# Patient Record
Sex: MALE | Race: WHITE | NOT HISPANIC OR LATINO | Employment: FULL TIME | URBAN - METROPOLITAN AREA
[De-identification: names, ages, dates, MRNs, and addresses within clinical notes are randomized per-mention and may not be internally consistent; named-entity substitution may affect disease eponyms.]

---

## 2017-03-09 ENCOUNTER — OFFICE VISIT (OUTPATIENT)
Dept: OTOLARYNGOLOGY | Facility: CLINIC | Age: 47
End: 2017-03-09
Payer: COMMERCIAL

## 2017-03-09 ENCOUNTER — OFFICE VISIT (OUTPATIENT)
Dept: AUDIOLOGY | Facility: CLINIC | Age: 47
End: 2017-03-09
Payer: COMMERCIAL

## 2017-03-09 VITALS — BODY MASS INDEX: 25.05 KG/M2 | HEIGHT: 70 IN | RESPIRATION RATE: 12 BRPM | WEIGHT: 175 LBS

## 2017-03-09 DIAGNOSIS — Z77.122 EXPOSED TO NOISE: ICD-10-CM

## 2017-03-09 DIAGNOSIS — R42 DIZZINESS AND GIDDINESS: ICD-10-CM

## 2017-03-09 DIAGNOSIS — H90.5 SENSORY HEARING LOSS, UNILATERAL: ICD-10-CM

## 2017-03-09 DIAGNOSIS — R42 DIZZINESS: Primary | ICD-10-CM

## 2017-03-09 PROCEDURE — 92557 COMPREHENSIVE HEARING TEST: CPT | Performed by: AUDIOLOGIST

## 2017-03-09 PROCEDURE — 99203 OFFICE O/P NEW LOW 30 MIN: CPT | Performed by: OTOLARYNGOLOGY

## 2017-03-09 PROCEDURE — 99207 ZZC NO CHARGE LOS: CPT | Performed by: AUDIOLOGIST

## 2017-03-09 PROCEDURE — 92567 TYMPANOMETRY: CPT | Performed by: AUDIOLOGIST

## 2017-03-09 NOTE — NURSING NOTE
"Chief Complaint   Patient presents with     Consult     dizziness and balance issues       Initial Resp 12  Ht 1.778 m (5' 10\")  Wt 79.4 kg (175 lb)  BMI 25.11 kg/m2 Estimated body mass index is 25.11 kg/(m^2) as calculated from the following:    Height as of this encounter: 1.778 m (5' 10\").    Weight as of this encounter: 79.4 kg (175 lb).  Medication Reconciliation: complete     Michael Clement CMA      "

## 2017-03-09 NOTE — PATIENT INSTRUCTIONS
Scheduling Information  To schedule your CT/MRI scan, please contact [facility] at [phone] OR [facility] at [phone]    To schedule your Surgery, please contact our Specialty Schedulers at [phone]      ENT Clinic Locations Clinic Hours Telephone Number     [facility]  [address] [place] [address]   Monday:           1:00pm -- 5:00pm    Friday:              8:00am - 12:00pm   To schedule/reschedule an appointment with   Dr. Jimenez,   please contact our   Specialty Scheduling Department at:     [phone]       Miriam Espitia  [address] [place] [address] Tuesday:          8:00am -- 2:00pm         Urgent Care Locations Clinic Hours Telephone Numbers     [facility]  [address] [place] [address]     Monday-Friday:     11:00am - 9:00pm    Saturday-Sunday:  9:00am - 5:00pm   [phone]     [facility]  [address] [place] [address]     Monday-Friday:      5:00pm - 9:00pm     Saturday-Sunday:  9:00am - 5:00pm   [phone]

## 2017-03-09 NOTE — MR AVS SNAPSHOT
"              After Visit Summary   3/9/2017    Yoni Hernandez    MRN: [id number]           Patient Information     Date Of Birth          1970        Visit Information        Provider Department      3/9/2017 1:00 PM Bibiana Maza AuD [facility]        Today's Diagnoses     Dizziness and giddiness        Exposed to noise           Follow-ups after your visit        Your next 10 appointments already scheduled     Mar 09, 2017  1:45 PM CST   New Visit with Ever Jimenez MD   [facility] ([facility])    [address] [place] [id number]   [phone]              Who to contact     If you have questions or need follow up information about today's clinic visit or your schedule please contact [facility] directly at [phone].  Normal or non-critical lab and imaging results will be communicated to you by MyChart, letter or phone within 4 business days after the clinic has received the results. If you do not hear from us within 7 days, please contact the clinic through MyChart or phone. If you have a critical or abnormal lab result, we will notify you by phone as soon as possible.  Submit refill requests through Hemoteq or call your pharmacy and they will forward the refill request to us. Please allow 3 business days for your refill to be completed.          Additional Information About Your Visit        MyChart Information     Hemoteq lets you send messages to your doctor, view your test results, renew your prescriptions, schedule appointments and more. To sign up, go to [email or web address] . Click on \"Log in\" on the left side of the screen, which will take you to the Welcome page. Then click on \"Sign up Now\" on the right side of the page.     You will be asked to enter the access code listed below, as well as some personal information. Please follow the directions to create your username and password.     Your access code is: " [email or web address]  Expires: 2017  1:39 PM     Your access code will  in 90 days. If you need help or a new code, please call your [place] clinic or [phone].        Care EveryWhere ID     This is your Care EveryWhere ID. This could be used by other organizations to access your [place] medical records  [id number]         Blood Pressure from Last 3 Encounters:   No data found for BP    Weight from Last 3 Encounters:   No data found for Wt              We Performed the Following     COMPREHENSIVE HEARING TEST     TYMPANOMETRY        Primary Care Provider    None Specified       No primary provider on file.        Thank you!     Thank you for choosing [facility]  for your care. Our goal is always to provide you with excellent care. Hearing back from our patients is one way we can continue to improve our services. Please take a few minutes to complete the written survey that you may receive in the mail after your visit with us. Thank you!             Your Updated Medication List - Protect others around you: Learn how to safely use, store and throw away your medicines at www.disposemymeds.org.      Notice  As of 3/9/2017  1:39 PM    You have not been prescribed any medications.

## 2017-03-09 NOTE — PROGRESS NOTES
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 He has been visiting with the [place] [facility], and also has some concern for his hearing. He was told that he has some balance issues by the [facility], but the details are unclear. He had full dizziness and balance testing done there as well.     His balance issues initially presented as lightheadedness without true vertigo in May, 2015. He did have an MRI of the brain that indicated no abnormal findings. He notes that there is photophobia, especially while on stage that might cause the dizziness. He does not have any past history for migraines, and is uncertain of any family history including migraines. He has not been having any alcohol for the last 2 months, and this seems to have helped. He has reduced his caffeine intake as well, but is still having some.     He has been using disposable ear plugs. He has had customized ear plugs in the past that did not fit as well as he thought that they should have. 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       Past Medical History - No past medical history on file.    Current Medications - No current outpatient prescriptions on file.    Allergies - Allergies not on file    Social History -   Social History     Social History     Marital status: Single     Spouse name: N/A     Number of children: N/A     Years of education: N/A     Social History Main Topics     Smoking status: Not on file     Smokeless tobacco: Not on file     Alcohol use Not on file     Drug use: Not on file     Sexual activity: Not on file     Other Topics Concern     Not on file     Social History Narrative       Family History - No family history on file.    Review of Systems - As per HPI and PMHx, otherwise review of system review of the head and neck  "negative.    This document serves as a record of the services and decisions personally performed and made by Ever Jimenez MD. It was created on his/her behalf by Helen Valdes, a trained medical scribe. The creation of this document is based the provider's statements to the medical scribe.    Scribblessing Valdes 1:54 PM, March 9, 2017    Physical Exam  Resp 12  Ht 1.778 m (5' 10\")  Wt 79.4 kg (175 lb)  BMI 25.11 kg/m2  BMI: Body mass index is 25.11 kg/(m^2).    General - The patient is well nourished and well developed, and appears to have good nutritional status.  Alert and oriented to person and place, answers questions and cooperates with examination appropriately.     Head and Face - Normocephalic and atraumatic, with no gross asymmetry noted of the contour of the facial features.  The facial nerve is intact, with strong symmetric movements.    Voice and Breathing - The patient was breathing comfortably without the use of accessory muscles. There was no wheezing, stridor, or stertor.  The patients voice was clear and strong, and had appropriate pitch and quality.    Ears - Bilateral pinna and EACs with normal appearing overlying skin. Tympanic membrane intact with good mobility on pneumatic otoscopy bilaterally. Bony landmarks of the ossicular chain are normal. The tympanic membranes are normal in appearance. No retraction, perforation, or masses.  No fluid or purulence was seen in the external canal or the middle ear.     Eyes - Extraocular movements intact.  Sclera were not icteric or injected, conjunctiva were pink and moist.    Mouth - Examination of the oral cavity showed pink, healthy oral mucosa. No lesions or ulcerations noted.  The tongue was mobile and midline, and the dentition were in good condition.      Throat - The walls of the oropharynx were smooth, pink, moist, symmetric, and had no lesions or ulcerations.  The tonsillar pillars and soft palate were symmetric.  The uvula was midline " on elevation.    Neck - Normal midline excursion of the laryngotracheal complex during swallowing.  Full range of motion on passive movement.  Palpation of the occipital, submental, submandibular, internal jugular chain, and supraclavicular nodes did not demonstrate any abnormal lymph nodes or masses.  The carotid pulse was palpable bilaterally.  Palpation of the thyroid was soft and smooth, with no nodules or goiter appreciated.  The trachea was mobile and midline.    Nose - External contour is symmetric, no gross deflection or scars.  Nasal mucosa is pink and moist with no abnormal mucus.  The septum was midline and non-obstructive, turbinates of normal size and position.  No polyps, masses, or purulence noted on examination.    Neuro - Nonfocal neuro exam is normal, CN 2 through 12 intact, normal gait and muscle tone. Romberg sign negative. No nystagmus.     Audiograms: His hearing is essentially normal, but his left ear is worse than the right. Normal pressures in the ears bilaterally.     A/P - Yoni Hernandez is a 46 year old male with symptoms consistent with vestibular migraines. Recommended that he start using acoustic ear plugs especially to protect his hearing on the left side. Discussed acupuncture, physical therapy, and massage for vestibular migraines. Would rather try more conservative methods before considering medications or other more aggressive treatments. Advised him that if his symptoms get any worse, to come in as soon as possible for follow up.     The information in this document, created by the medical scribe for me, accurately reflects the services I personally performed and the decisions made by me. I have reviewed and approved this document for accuracy prior to leaving the patient care area.  Ever Jimenez MD  1:53 PM, 03/09/17    Ever Jimenez MD

## 2017-03-09 NOTE — MR AVS SNAPSHOT
After Visit Summary   3/9/2017    Yoni Hernandez    MRN: [id number]           Patient Information     Date Of Birth          1970        Visit Information        Provider Department      3/9/2017 1:45 PM Ever Jimenez MD [facility]        Today's Diagnoses     Dizziness    -  1    Sensory hearing loss, unilateral          Care Instructions    Scheduling Information  To schedule your CT/MRI scan, please contact [facility] at [phone] OR [facility] at [phone]    To schedule your Surgery, please contact our Specialty Schedulers at [phone]      ENT Clinic Locations Clinic Hours Telephone Number     [facility] [place]  [address]  [place] [address]   Monday:           1:00pm -- 5:00pm    Friday:              8:00am - 12:00pm   To schedule/reschedule an appointment with   Dr. Jimenez,   please contact our   Specialty Scheduling Department at:     [phone]       [facility]  [address] [place] [address] Tuesday:          8:00am -- 2:00pm         Urgent Care Locations Clinic Hours Telephone Numbers     [facility]  [address] [place] [address]     Monday-Friday:     11:00am - 9:00pm    Saturday-Sunday:  9:00am - 5:00pm   [phone]     [facility]  [address] [place] [address]     Monday-Friday:      5:00pm - 9:00pm     Saturday-Sunday:  9:00am - 5:00pm   [phone]                 Follow-ups after your visit        Who to contact     If you have questions or need follow up information about today's clinic visit or your schedule please contact [facility] directly at [phone].  Normal or non-critical lab and imaging results will be communicated to you by MyChart, letter or phone within 4 business days after the clinic has received the results. If you do not hear from us within 7 days, please contact the clinic through MyChart or phone. If you have a critical  "or abnormal lab result, we will notify you by phone as soon as possible.  Submit refill requests through ActiveSec or call your pharmacy and they will forward the refill request to us. Please allow 3 business days for your refill to be completed.          Additional Information About Your Visit        Struttahart Information     ActiveSec lets you send messages to your doctor, view your test results, renew your prescriptions, schedule appointments and more. To sign up, go to [email or web address] . Click on \"Log in\" on the left side of the screen, which will take you to the Welcome page. Then click on \"Sign up Now\" on the right side of the page.     You will be asked to enter the access code listed below, as well as some personal information. Please follow the directions to create your username and password.     Your access code is: [email or web address]  Expires: 2017  2:39 PM     Your access code will  in 90 days. If you need help or a new code, please call your [place] clinic or [phone].        Care EveryWhere ID     This is your Care EveryWhere ID. This could be used by other organizations to access your [place] medical records  [id number]        Your Vitals Were     Respirations Height BMI (Body Mass Index)             12 1.778 m (5' 10\") 25.11 kg/m2          Blood Pressure from Last 3 Encounters:   No data found for BP    Weight from Last 3 Encounters:   17 79.4 kg (175 lb)              Today, you had the following     No orders found for display       Primary Care Provider    None Specified       No primary provider on file.        Thank you!     Thank you for choosing [facility]  for your care. Our goal is always to provide you with excellent care. Hearing back from our patients is one way we can continue to improve our services. Please take a few minutes to complete the written survey that you may receive in the mail after your visit with us. Thank you!             Your Updated " Medication List - Protect others around you: Learn how to safely use, store and throw away your medicines at www.disposemymeds.org.      Notice  As of 3/9/2017 11:59 PM    You have not been prescribed any medications.

## 2017-03-09 NOTE — PROGRESS NOTES
AUDIOLOGY REPORT    SUBJECTIVE:  Yoni Hernandez is a 46 year old male, was referred by ENT at [facility] for audiologic evaluation today.  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  He suspects a possible hearing loss in his left ear.     OBJECTIVE:    Pure Tone Thresholds assessed using conventional audiometry with good reliability from 250-8000 Hz bilaterally using high frequency circumaural headphones revealed;    RIGHT:  Normal hearing     LEFT:    Normal hearing with borderline mild noise notch at 3 KHz   Please refer to scanned audiogram(s) for further details.     Word Recognition Score:     RIGHT: 100% at 45 dB HL using NU-6  recorded word list.    LEFT:   100% at 45 dB HL using NU-6 recorded word list.    Tympanogram:     RIGHT: normal eardrum mobility    LEFT:   normal eardrum mobility    Musician Earplug impressions taken bilaterally without incident and 1/2 shell style clear darrell right and brown left were ordered with a white nylon string per his request.    ASSESSMENT:   Dizziness     Today s results were discussed with the patient in detail and a copy of the audiogram was provided upon request.    PLAN:     Returned to ENT for follow up consult.  He was asked to schedule an earmold fitting appointment with me in 4 weeks.    Bibiana Maza M.S., F-AAA  Licensed Audiologist, [place] #7020

## 2017-03-27 ENCOUNTER — TELEPHONE (OUTPATIENT)
Dept: AUDIOLOGY | Facility: CLINIC | Age: 47
End: 2017-03-27

## 2017-03-31 NOTE — TELEPHONE ENCOUNTER
Called and left message to  earmolds at [facility] .  Bibiana Maza M.S., F-AAA  Licensed Audiologist, [place] #5[address]

## 2018-09-28 NOTE — TELEPHONE ENCOUNTER
FUTURE VISIT INFORMATION      FUTURE VISIT INFORMATION:    Date: 10/03    Time: 2:00    Location:   REFERRAL INFORMATION:    Referring provider:  self    Referring providers clinic:      Reason for visit/diagnosis:  Left shoulder pain        RECORDS STATUS      No outside records

## 2018-10-03 ENCOUNTER — RADIANT APPOINTMENT (OUTPATIENT)
Dept: GENERAL RADIOLOGY | Facility: CLINIC | Age: 48
End: 2018-10-03
Payer: COMMERCIAL

## 2018-10-03 ENCOUNTER — OFFICE VISIT (OUTPATIENT)
Dept: ORTHOPEDICS | Facility: CLINIC | Age: 48
End: 2018-10-03
Payer: COMMERCIAL

## 2018-10-03 ENCOUNTER — PRE VISIT (OUTPATIENT)
Dept: ORTHOPEDICS | Facility: CLINIC | Age: 48
End: 2018-10-03

## 2018-10-03 VITALS — RESPIRATION RATE: 16 BRPM | BODY MASS INDEX: 27.2 KG/M2 | WEIGHT: 190 LBS | HEIGHT: 70 IN

## 2018-10-03 DIAGNOSIS — M67.922 TENDINOPATHY OF LEFT BICEPS TENDON: ICD-10-CM

## 2018-10-03 DIAGNOSIS — M25.512 LEFT SHOULDER PAIN, UNSPECIFIED CHRONICITY: Primary | ICD-10-CM

## 2018-10-03 DIAGNOSIS — M67.912 TENDINOPATHY OF LEFT ROTATOR CUFF: ICD-10-CM

## 2018-10-03 DIAGNOSIS — M25.512 LEFT SHOULDER PAIN, UNSPECIFIED CHRONICITY: ICD-10-CM

## 2018-10-03 DIAGNOSIS — M75.42 IMPINGEMENT SYNDROME OF LEFT SHOULDER: ICD-10-CM

## 2018-10-03 DIAGNOSIS — M25.512 PAIN IN JOINT OF LEFT SHOULDER: Primary | ICD-10-CM

## 2018-10-03 RX ORDER — BUPROPION HYDROCHLORIDE 300 MG/1
300 TABLET ORAL
COMMUNITY
Start: 2018-05-30

## 2018-10-03 RX ORDER — IBUPROFEN 200 MG
400 TABLET ORAL
COMMUNITY

## 2018-10-03 RX ADMIN — TRIAMCINOLONE ACETONIDE 40 MG: 40 INJECTION, SUSPENSION INTRA-ARTICULAR; INTRAMUSCULAR at 15:16

## 2018-10-03 RX ADMIN — LIDOCAINE HYDROCHLORIDE 4 ML: 10 INJECTION, SOLUTION INFILTRATION; PERINEURAL at 15:16

## 2018-10-03 NOTE — LETTER
"  10/3/2018      RE: Yoni Hernandez  [address] [place] [address]        Subjective:   Yoni Hernandez is a 48 year old male who presents with left shoulder pain. 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 No ELIZABETH.    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  Over the summer he was a member of a trip to  [place] during which he was carrying his bag around the left side and playing concerts more often than usual when the initial pain in his left shoulder started.  Normally does yoga but had to stop doing this because after yoga session he would have worse pain probably related to downward dog and other shoulder intensive yoga poses.      Background:   Date of injury: NA   Duration of symptoms: 2 months  Mechanism of Injury: Insidious Onset; Activity Related playing the violin   Aggravating factors: Yoga, playing violin, reaching overhead  Relieving Factors: massage, hot baths   Prior Evaluation: Physical Therapy-Orthology     Takes some advil probably 400 2-3 times a day that helps some (probably for the last 2 month)    PAST MEDICAL, SOCIAL, SURGICAL AND FAMILY HISTORY: He  has no past medical history on file.  He  has no past surgical history on file.  His family history is not on file.  He reports that he has never smoked. He has never used smokeless tobacco.     Grandmother with arthritis.     ALLERGIES: He is allergic to sulfa drugs.    CURRENT MEDICATIONS: He has a current medication list which includes the following prescription(s): bupropion and ibuprofen.     REVIEW OF SYSTEMS: 6 point review of systems is negative except as noted above.     Exam:   Resp 16  Ht 5' 10\" (1.778 m)  Wt 190 lb (86.2 kg)  BMI 27.26 kg/m2           CONSTITUTIONAL: healthy, alert and no distress  HEAD: Normocephalic. No masses, lesions, tenderness or abnormalities  SKIN: no suspicious lesions or rashes  GAIT: normal  NEUROLOGIC: Non-focal  PSYCHIATRIC: affect normal/bright and mentation appears " normal.    MUSCULOSKELETAL:   Inspection: Mild skin irritation where patient had previously been taped.  No other deformity or change visible.  Palpation: Tenderness over posterior left humeral head  Range of motion is limited by pain at 130 degrees of forward flexion and mild discomfort between 90 and 140 degrees with abduction also has reproduction of pain with external rotation.  Strength: Decreased strength with external rotation (4+/5) on left, right ER 5/5, left IR strength 5-/5, normal on right, mild discomfort with supraspinatus testing on left normal on right positive Neer and Daniel impingement test positive speeds test positive [place] test positive Yergason test  Does have some scattered mild left scapular abnormal movements    X-ray interpretation: 4 views of the shoulder were obtained the patient shows no traumatic injury or fracture lines, osseous changes consistent with osteoarthritis are evident on the humeral head with some spurring and mild decrease of the glenohumeral joint space, likely decreased distance between AC joint and humeral head    Procedure: Written informed consent was obtained from the patient.  The appropriate area was cleansed with chlorhexidine and 4 mL 1% lidocaine without epinephrine and 1 mL (40 mg) Kenalog were injected with a 25-gauge 1.5 inch needle using landmark guided technique.  Patient tolerated the procedure well bandage was applied       Assessment/Plan:   Yoni was seen today for consult.    Diagnoses and all orders for this visit:    Pain in joint of left shoulder  -     MR Shoulder Left w/o Contrast; Future  -     DRAIN/INJECT LARGE JOINT/BURSA    Impingement syndrome of left shoulder  -     DRAIN/INJECT LARGE JOINT/BURSA  -     Large Joint Injection/Arthocentesis    Tendinopathy of left rotator cuff    Tendinopathy of left biceps tendon      I long discussion with Mr. Hernandez today in regards to his shoulder.  He has evidence of osteoarthritis on x-ray, pain with  labral testing and instability testing, pain and weakness with rotator cuff muscle testing (all 3) and pain with biceps testing.  This could all be related to osteoarthritis affecting the mechanics of the shoulder versus repetitive overuse injury of his biceps and rotator cuff tendons.  Has a history of instability feeling in his childhood likely has had a labral tear for most of his life clear if that is the main pain  at this time.    Given that he has a click with some certain movements of his shoulder question of bicipital tendon subluxation can be further evaluated at his follow-up using ultrasound.    If he has not made significant improvement to his symptoms in the next 2 weeks with continued therapy and the injection today I recommend further evaluation with an MRI.      Patient Instructions   [facility]. [facility], Pierre is good    2.  For your shoulder there is some arthritis in the shoulder    3. Injection today should help some, you can see how things go and let us know in 2 weeks. If it is no change or not getting better then we'll get an MRI. You can cancel if much better     4. Come back in 3-4 weeks to check your progress, we may do ultrasound to look at your tendons.      Patient seen and discussed with Dr. Miranda Carter.     Santhosh Arriaza MD  Sports Medicine Fellow  10/3/2018 3:35 PM      Large Joint Injection/Arthocentesis  Date/Time: 10/3/2018 3:16 PM  Performed by: MIRANDA CARTER  Authorized by: MIRANDA CARTER     Indications:  Pain  Needle Size:  25 G  Guidance: landmark guided    Medications:  40 mg triamcinolone acetonide 40 MG/ML; 4 mL lidocaine 1 %  Consent Given by:  Patient  Timeout: timeout called immediately prior to procedure    Prep: patient was prepped and draped in usual sterile fashion     16ml of 1% lidocaine was wasted per MD           Attending Note:   I have personally examined this patient and have reviewed the  clinical presentation and progress note with the fellow. I agree with the treatment plan as outlined. The plan was formulated with the fellow on the day of the patient's visit. I have reviewed all imaging with the fellow and agree with the findings in the documentation.   I have supervised the injection.   Miranda Huston MD, CAQ, CCD  [facility]  Sports Medicine and Bone Health      Miranda Huston MD

## 2018-10-03 NOTE — PATIENT INSTRUCTIONS
1. Keep doing therapy with the orthology team, Pierre is good    2.  For your shoulder there is some arthritis in the shoulder    3. Injection today should help some, you can see how things go and let us know in 2 weeks. If it is no change or not getting better then we'll get an MRI. You can cancel if much better     4. Come back in 3-4 weeks to check your progress, we may do ultrasound to look at your tendons.

## 2018-10-03 NOTE — MR AVS SNAPSHOT
After Visit Summary   10/3/2018    Yoni Hernandez    MRN: [id number]           Patient Information     Date Of Birth          1970        Visit Information        Provider Department      10/3/2018 2:00 PM Miranda Huston MD [facility] Sports Medicine        Today's Diagnoses     Pain in joint of left shoulder    -  1    Impingement syndrome of left shoulder          Care Instructions    1. [facility], Pierre is good    2.  For your shoulder there is some arthritis in the shoulder    3. Injection today should help some, you can see how things go and let us know in 2 weeks. If it is no change or not getting better then we'll get an MRI. You can cancel if much better     4. Come back in 3-4 weeks to check your progress, we may do ultrasound to look at your tendons.           Follow-ups after your visit        Your next 10 appointments already scheduled     Oct 17, 2018  4:00 PM CDT   (Arrive by 3:30 PM)   MR SHOULDER LEFT W/O CONTRAST with [id number]   [facility] ([facility])    [address] [place] [address] [phone]           How do I prepare for my exam? (Food and drink instructions) **If you will be receiving sedation or general anesthesia, please see special notes below.**  How do I prepare for my exam? (Other instructions) Take your medicines as usual, unless your doctor tells you not to. Please remove any body piercings and hair extensions before you arrive. Follow your doctor s orders. If you do not, we may have to postpone your exam. You may or may not receive IV contrast for this exam pending the discretion of the Radiologist.  You do not need to do anything special to prepare. **If you will be receiving sedation or general anesthesia, please see special notes below.**  What should I wear:  The MRI machine uses a strong magnet. Please wear clothes without metal (snaps,  zippers). A sweatsuit works well, or we may give you a hospital gown.  How long does the exam take: Most tests take 30 to 60 minutes.  HOWEVER, IF YOUR DOCTOR PRESCRIBES ANESTHESIA please plan on spending four to five hours in the recovery room.  What should I bring: Bring a list of your current medicines to your exam (including vitamins, minerals and over-the-counter drugs). Also bring the results of similar scans you may have had.  Do I need a : **If you will be receiving sedation or general anesthesia, please see special notes below.**  What should I do after the exam? No Restrictions, You may resume normal activities.  What is this test: MRI (magnetic resonance imaging) uses a strong magnet and radio waves to look inside the body. An MRA (magnetic resonance angiogram) does the same thing, but it lets us look at your blood vessels. A computer turns the radio waves into pictures showing cross sections of the body, much like slices of bread. This helps us see any problems more clearly.  Who should I call with questions: Please call the Imaging Department at your exam site with any questions. Directions, parking instructions, and other information is available on our website, [email or web address].  How do I prepare if I m having sedation or anesthesia? **IMPORTANT** THE INSTRUCTIONS BELOW ARE ONLY FOR THOSE PATIENTS WHO HAVE BEEN TOLD THEY WILL RECEIVE SEDATION OR GENERAL ANESTHESIA DURING THEIR MRI PROCEDURE:  IF YOU WILL RECEIVE SEDATION (take medicine to help you relax during your exam): You must get the medicine from your doctor before you arrive. Bring the medicine to the exam. Do not take it at home. Arrive one hour early. Bring someone who can take you home after the test. Your medicine will make you sleepy. After the exam, you may not drive, take a bus or take a taxi by yourself. No eating 8 hours before your exam. You may have clear liquids up until 4 hours before your exam. (Clear liquids include  water, clear tea, black coffee and fruit juice without pulp.)  IF YOU WILL RECEIVE ANESTHESIA (be asleep for your exam): Arrive 1 1/2 hours early. Bring someone who can take you home after the test. You may not drive, take a bus or take a taxi by yourself. No eating 8 hours before your exam. You may have clear liquids up until 4 hours before your exam. (Clear liquids include water, clear tea, black coffee and fruit juice without pulp.) You will spend four to five hours in the recovery room.            2018  3:40 PM CST   (Arrive by 3:25 PM)   Return Visit with Santhosh Arriaza MD   [facility] ([facility])    [address] [address] [phone]              Future tests that were ordered for you today     Open Future Orders        Priority Expected Expires Ordered    MR Shoulder Left w/o Contrast Routine  10/3/2019 10/3/2018            Who to contact     Please call your clinic at [phone] to:    Ask questions about your health    Make or cancel appointments    Discuss your medicines    Learn about your test results    Speak to your doctor            Additional Information About Your Visit        Interludehart Information     Meet [employer]out is an electronic gateway that provides easy, online access to your medical records. With [employer], you can request a clinic appointment, read your test results, renew a prescription or communicate with your care team.     To sign up for Meet Yout visit the website at [email or web address]   You will be asked to enter the access code listed below, as well as some personal information. Please follow the directions to create your username and password.     Your access code is: [email or web address]  Expires: 2019  3:33 PM     Your access code will  in 90 days. If you need help or a new code, please contact your [facility] or call [phone] for assistance.        Care  "EveryWhere ID     This is your Care EveryWhere ID. This could be used by other organizations to access your [place] medical records  [id number]        Your Vitals Were     Respirations Height BMI (Body Mass Index)             16 5' 10\" (1.778 m) 27.26 kg/m2          Blood Pressure from Last 3 Encounters:   No data found for BP    Weight from Last 3 Encounters:   10/03/18 190 lb (86.2 kg)   03/09/17 175 lb (79.4 kg)              We Performed the Following     DRAIN/INJECT LARGE JOINT/BURSA     Large Joint Injection/Arthocentesis        Primary Care Provider    None Specified       No primary provider on file.        Equal Access to Services     [facility]: Hadii aad dana haddiana Sojim, wafelisada lukekeadaha, qaybta kaalmada joseyada, socrates melendez . So [facility] [place] [facility] [phone].    ATENCIÓN: Si habla español, tiene a orozco disposición servicios gratuitos de asistencia lingüística. Llame al [phone].    We comply with applicable federal civil rights laws and [place] laws. We do not discriminate on the basis of race, color, national origin, age, disability, sex, sexual orientation, or gender identity.            Thank you!     Thank you for choosing [facility]  for your care. Our goal is always to provide you with excellent care. Hearing back from our patients is one way we can continue to improve our services. Please take a few minutes to complete the written survey that you may receive in the mail after your visit with us. Thank you!             Your Updated Medication List - Protect others around you: Learn how to safely use, store and throw away your medicines at www.disposemymeds.org.          This list is accurate as of 10/3/18  3:33 PM.  Always use your most recent med list.                   Brand Name Dispense Instructions for use Diagnosis    buPROPion 300 MG 24 hr tablet    WELLBUTRIN XL     Take 300 mg by mouth        ibuprofen 200 MG tablet    ADVIL/MOTRIN     Take 400 " mg by mouth

## 2018-10-03 NOTE — LETTER
Date:October 5, 2018      Patient was self referred, no letter generated. Do not send.        [facility] Health Information

## 2018-10-03 NOTE — PROGRESS NOTES
" Subjective:   Yoni Hernandez is a 48 year old male who presents with left shoulder pain. 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 No ELIZABETH.    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  Over the summer he was a member of a trip to  [place] during which he was carrying his bag around the left side and playing concerts more often than usual when the initial pain in his left shoulder started.  Normally does yoga but had to stop doing this because after yoga session he would have worse pain probably related to downward dog and other shoulder intensive yoga poses.      Background:   Date of injury: NA   Duration of symptoms: 2 months  Mechanism of Injury: Insidious Onset; Activity Related playing the violin   Aggravating factors: Yoga, playing violin, reaching overhead  Relieving Factors: massage, hot baths   Prior Evaluation: Physical Therapy-Orthology     Takes some advil probably 400 2-3 times a day that helps some (probably for the last 2 month)    PAST MEDICAL, SOCIAL, SURGICAL AND FAMILY HISTORY: He  has no past medical history on file.  He  has no past surgical history on file.  His family history is not on file.  He reports that he has never smoked. He has never used smokeless tobacco.     Grandmother with arthritis.     ALLERGIES: He is allergic to sulfa drugs.    CURRENT MEDICATIONS: He has a current medication list which includes the following prescription(s): bupropion and ibuprofen.     REVIEW OF SYSTEMS: 6 point review of systems is negative except as noted above.     Exam:   Resp 16  Ht 5' 10\" (1.778 m)  Wt 190 lb (86.2 kg)  BMI 27.26 kg/m2           CONSTITUTIONAL: healthy, alert and no distress  HEAD: Normocephalic. No masses, lesions, tenderness or abnormalities  SKIN: no suspicious lesions or rashes  GAIT: normal  NEUROLOGIC: Non-focal  PSYCHIATRIC: affect normal/bright and mentation appears normal.    MUSCULOSKELETAL:   Inspection: Mild skin irritation where patient had " previously been taped.  No other deformity or change visible.  Palpation: Tenderness over posterior left humeral head  Range of motion is limited by pain at 130 degrees of forward flexion and mild discomfort between 90 and 140 degrees with abduction also has reproduction of pain with external rotation.  Strength: Decreased strength with external rotation (4+/5) on left, right ER 5/5, left IR strength 5-/5, normal on right, mild discomfort with supraspinatus testing on left normal on right positive Neer and Daniel impingement test positive speeds test positive Marengo test positive Yergason test  Does have some scattered mild left scapular abnormal movements    X-ray interpretation: 4 views of the shoulder were obtained the patient shows no traumatic injury or fracture lines, osseous changes consistent with osteoarthritis are evident on the humeral head with some spurring and mild decrease of the glenohumeral joint space, likely decreased distance between AC joint and humeral head    Procedure: Written informed consent was obtained from the patient.  The appropriate area was cleansed with chlorhexidine and 4 mL 1% lidocaine without epinephrine and 1 mL (40 mg) Kenalog were injected with a 25-gauge 1.5 inch needle using landmark guided technique.  Patient tolerated the procedure well bandage was applied       Assessment/Plan:   Yoni was seen today for consult.    Diagnoses and all orders for this visit:    Pain in joint of left shoulder  -     MR Shoulder Left w/o Contrast; Future  -     DRAIN/INJECT LARGE JOINT/BURSA    Impingement syndrome of left shoulder  -     DRAIN/INJECT LARGE JOINT/BURSA  -     Large Joint Injection/Arthocentesis    Tendinopathy of left rotator cuff    Tendinopathy of left biceps tendon      I long discussion with Mr. Hernandez today in regards to his shoulder.  He has evidence of osteoarthritis on x-ray, pain with labral testing and instability testing, pain and weakness with rotator cuff  muscle testing (all 3) and pain with biceps testing.  This could all be related to osteoarthritis affecting the mechanics of the shoulder versus repetitive overuse injury of his biceps and rotator cuff tendons.  Has a history of instability feeling in his childhood likely has had a labral tear for most of his life clear if that is the main pain  at this time.    Given that he has a click with some certain movements of his shoulder question of bicipital tendon subluxation can be further evaluated at his follow-up using ultrasound.    If he has not made significant improvement to his symptoms in the next 2 weeks with continued therapy and the injection today I recommend further evaluation with an MRI.      Patient Instructions   1. Keep doing therapy with the [facility] team, Pierre is good    2.  For your shoulder there is some arthritis in the shoulder    3. Injection today should help some, you can see how things go and let us know in 2 weeks. If it is no change or not getting better then we'll get an MRI. You can cancel if much better     4. Come back in 3-4 weeks to check your progress, we may do ultrasound to look at your tendons.      Patient seen and discussed with Dr. Miranda Carter.     Santhosh Arriaza MD  Sports Medicine Fellow  10/3/2018 3:35 PM      Large Joint Injection/Arthocentesis  Date/Time: 10/3/2018 3:16 PM  Performed by: MIRANDA CARTER  Authorized by: MIRANDA CARTER     Indications:  Pain  Needle Size:  25 G  Guidance: landmark guided    Medications:  40 mg triamcinolone acetonide 40 MG/ML; 4 mL lidocaine 1 %  Consent Given by:  Patient  Timeout: timeout called immediately prior to procedure    Prep: patient was prepped and draped in usual sterile fashion     16ml of 1% lidocaine was wasted per MD

## 2018-10-04 RX ORDER — LIDOCAINE HYDROCHLORIDE 10 MG/ML
4 INJECTION, SOLUTION INFILTRATION; PERINEURAL
Status: SHIPPED | OUTPATIENT
Start: 2018-10-03

## 2018-10-04 RX ORDER — TRIAMCINOLONE ACETONIDE 40 MG/ML
40 INJECTION, SUSPENSION INTRA-ARTICULAR; INTRAMUSCULAR
Status: SHIPPED | OUTPATIENT
Start: 2018-10-03

## 2018-10-04 NOTE — PROGRESS NOTES
Attending Note:   I have personally examined this patient and have reviewed the clinical presentation and progress note with the fellow. I agree with the treatment plan as outlined. The plan was formulated with the fellow on the day of the patient's visit. I have reviewed all imaging with the fellow and agree with the findings in the documentation.   I have supervised the injection.   Miranda Huston MD, CAQ, CCD  [facility]  Sports Medicine and Bone Health

## 2018-10-22 ENCOUNTER — TELEPHONE (OUTPATIENT)
Dept: ORTHOPEDICS | Facility: CLINIC | Age: 48
End: 2018-10-22

## 2018-10-22 NOTE — TELEPHONE ENCOUNTER
[facility] Call Center    Phone Message    May a detailed message be left on voicemail: no    Reason for Call: Other: Pt called in to request that Dr. Huston send an order for an open, upright MRI to [facility] in [place]. He is too claustrophobic for traditional ones. Please fax to [phone]. Pt sees Dr. Huston on 11/7 to go over results.     Action Taken: Message routed to:  [facility] ([facility]

## 2018-11-07 ENCOUNTER — OFFICE VISIT (OUTPATIENT)
Dept: ORTHOPEDICS | Facility: CLINIC | Age: 48
End: 2018-11-07
Payer: COMMERCIAL

## 2018-11-07 VITALS — RESPIRATION RATE: 16 BRPM | BODY MASS INDEX: 27.2 KG/M2 | HEIGHT: 70 IN | WEIGHT: 190 LBS

## 2018-11-07 DIAGNOSIS — M67.912 TENDINOPATHY OF LEFT ROTATOR CUFF: Primary | ICD-10-CM

## 2018-11-07 NOTE — MR AVS SNAPSHOT
"              After Visit Summary   11/7/2018    Yoni Hernandez    MRN: [id number]           Patient Information     Date Of Birth          1970        Visit Information        Provider Department      11/7/2018 2:00 PM Miranda Huston MD [facility] Sports Medicine        Today's Diagnoses     Tendinopathy of left rotator cuff    -  1       Follow-ups after your visit        Who to contact     Please call your clinic at [phone] to:    Ask questions about your health    Make or cancel appointments    Discuss your medicines    Learn about your test results    Speak to your doctor            Additional Information About Your Visit        MyChart Information     [employer] gives you secure access to your electronic health record. If you see a primary care provider, you can also send messages to your care team and make appointments. If you have questions, please call your primary care clinic.  If you do not have a primary care provider, please call [phone] and they will assist you.      [employer] is an electronic gateway that provides easy, online access to your medical records. With [employer], you can request a clinic appointment, read your test results, renew a prescription or communicate with your care team.     To access your existing account, please contact your [facility] or call [phone] for assistance.        Care EveryWhere ID     This is your Care EveryWhere ID. This could be used by other organizations to access your [place] medical records  [id number]        Your Vitals Were     Respirations Height BMI (Body Mass Index)             16 1.778 m (5' 10\") 27.26 kg/m2          Blood Pressure from Last 3 Encounters:   No data found for BP    Weight from Last 3 Encounters:   11/07/18 86.2 kg (190 lb)   10/03/18 86.2 kg (190 lb)   03/09/17 79.4 kg (175 lb)              Today, you had the following     No orders found for display       Primary Care Provider    " None Specified       No primary provider on file.        Equal Access to Services     [facility] : Hadii aad ku hadjosebella Ledesma, jared adkins, elijahsocrates conner. So [facility] [phone].    ATENCIÓN: Si habla español, tiene a orozco disposición servicios gratuitos de asistencia lingüística. Llame al [phone].    We comply with applicable federal civil rights laws and [place] laws. We do not discriminate on the basis of race, color, national origin, age, disability, sex, sexual orientation, or gender identity.            Thank you!     Thank you for choosing [facility]  for your care. Our goal is always to provide you with excellent care. Hearing back from our patients is one way we can continue to improve our services. Please take a few minutes to complete the written survey that you may receive in the mail after your visit with us. Thank you!             Your Updated Medication List - Protect others around you: Learn how to safely use, store and throw away your medicines at www.disposemymeds.org.          This list is accurate as of 11/7/18 11:59 PM.  Always use your most recent med list.                   Brand Name Dispense Instructions for use Diagnosis    buPROPion 300 MG 24 hr tablet    WELLBUTRIN XL     Take 300 mg by mouth        ibuprofen 200 MG tablet    ADVIL/MOTRIN     Take 400 mg by mouth

## 2018-11-07 NOTE — LETTER
"  11/7/2018      RE: Yoni Hernandez  [address] [place] [address]        Subjective:   Yoni Hernandez is a 48 year old male who presents for follow up of left shoulder pain. 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 Has had significant improvement in his shoulder pain. The sharpness is gone in front/side. Does have a new back tightness/achy pain but that rarely bothers him.    Has been seeing Pierre with orthology. Has been off yoga since then and is considering resuming starting that.     Occupation is a full time [employer] member.     Not needing oral pain meds or really ice    PAST MEDICAL, SOCIAL, SURGICAL AND FAMILY HISTORY: He  has no past medical history on file.  He  has a past surgical history that includes Colon surgery.  His family history includes Arthritis in his paternal grandmother.  He reports that he has never smoked. He has never used smokeless tobacco. He reports that he drinks about 8.4 oz of alcohol per week          ALLERGIES: He is allergic to sulfa drugs.    CURRENT MEDICATIONS: He has a current medication list which includes the following prescription(s): bupropion and ibuprofen, and the following Facility-Administered Medications: lidocaine and triamcinolone acetonide.     REVIEW OF SYSTEMS: 6 point review of systems is negative except as noted above.         Exam:   Resp 16  Ht 5' 10\" (1.778 m)  Wt 190 lb (86.2 kg)  BMI 27.26 kg/m2        CONSTITUTIONAL: healthy, alert and no distress  HEAD: Normocephalic. No masses, lesions, tenderness or abnormalities  SKIN: no suspicious lesions or rashes  GAIT: normal  NEUROLOGIC: Non-focal  PSYCHIATRIC: affect normal/bright and mentation appears normal.    MUSCULOSKELETAL:   nttp over shoulder. ROM is full with mild achy inconsistently reproduced with overhead motion. Strength 5/5 in IR, ER and empty can test.   Negative neers guido's test. Negative speeds " test       Assessment/Plan:   Yoni was seen today for recheck.    Diagnoses and all orders for this visit:    Tendinopathy of left rotator cuff      He had significant improvement with the injection one month ago has continued to do the exercises.     He actually wasn't able to get the MRI 2/2 claustrophobia had it reschedueld for an open sided MRI at [facility] for a few weeks from now.     Discussed that he have some arthritis and still will probably struggle with this off and on throughout his life pending how things go. I suspect that he will do well with continued strength and rehab and will not need further imaging at this time. He will cancel his MRI  - cancel MRI  - continue PT  - f/u PRN      Patient seen and discussed with Dr. Miranda Huston.     Santhosh Arriaza MD  Sports Medicine Fellow  11/7/2018 2:54 PM      Answers for HPI/ROS submitted by the patient on 11/7/2018   PHQ-2 Score: 0      Procedures      Attending Note:   I have personally examined this patient and have reviewed the clinical presentation and progress note with the fellow. I agree with the treatment plan as outlined. The plan was formulated with the fellow on the day of the patient's visit. I have reviewed all imaging with the fellow and agree with the findings in the documentation.     Miranda Huston MD, CAQ, CCD  [facility]  Sports Medicine and Bone Health  Answers for HPI/ROS submitted by the patient on 11/7/2018   PHQ-2 Score: 0      Miranda Huston MD

## 2018-11-07 NOTE — PROGRESS NOTES
" Subjective:   Yoni Hernandez is a 48 year old male who presents for follow up of left shoulder pain. 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 Has had significant improvement in his shoulder pain. The sharpness is gone in front/side. Does have a new back tightness/achy pain but that rarely bothers him.    Has been seeing Pierre with orthology. Has been off yoga since then and is considering resuming starting that.     Occupation is a full time [employer] member.     Not needing oral pain meds or really ice    PAST MEDICAL, SOCIAL, SURGICAL AND FAMILY HISTORY: He  has no past medical history on file.  He  has a past surgical history that includes Colon surgery.  His family history includes Arthritis in his paternal grandmother.  He reports that he has never smoked. He has never used smokeless tobacco. He reports that he drinks about 8.4 oz of alcohol per week          ALLERGIES: He is allergic to sulfa drugs.    CURRENT MEDICATIONS: He has a current medication list which includes the following prescription(s): bupropion and ibuprofen, and the following Facility-Administered Medications: lidocaine and triamcinolone acetonide.     REVIEW OF SYSTEMS: 6 point review of systems is negative except as noted above.         Exam:   Resp 16  Ht 5' 10\" (1.778 m)  Wt 190 lb (86.2 kg)  BMI 27.26 kg/m2        CONSTITUTIONAL: healthy, alert and no distress  HEAD: Normocephalic. No masses, lesions, tenderness or abnormalities  SKIN: no suspicious lesions or rashes  GAIT: normal  NEUROLOGIC: Non-focal  PSYCHIATRIC: affect normal/bright and mentation appears normal.    MUSCULOSKELETAL:   nttp over shoulder. ROM is full with mild achy inconsistently reproduced with overhead motion. Strength 5/5 in IR, ER and empty can test.   Negative neers guido's test. Negative speeds test       Assessment/Plan:   Yoni was seen today for recheck.    Diagnoses and all " orders for this visit:    Tendinopathy of left rotator cuff      He had significant improvement with the injection one month ago has continued to do the exercises.     He actually wasn't able to get the MRI 2/2 claustrophobia had it reschedueld for an open sided MRI at [facility] for a few weeks from now.     Discussed that he have some arthritis and still will probably struggle with this off and on throughout his life pending how things go. I suspect that he will do well with continued strength and rehab and will not need further imaging at this time. He will cancel his MRI  - cancel MRI  - continue PT  - f/u PRN      Patient seen and discussed with Dr. Miranda Huston.     Santhosh Arriaza MD  Sports Medicine Fellow  11/7/2018 2:54 PM      Answers for HPI/ROS submitted by the patient on 11/7/2018   PHQ-2 Score: 0      Procedures

## 2018-11-07 NOTE — LETTER
Date:November 20, 2018      Patient was self referred, no letter generated. Do not send.        [facility] Information

## 2018-11-19 NOTE — PROGRESS NOTES
Attending Note:   I have personally examined this patient and have reviewed the clinical presentation and progress note with the fellow. I agree with the treatment plan as outlined. The plan was formulated with the fellow on the day of the patient's visit. I have reviewed all imaging with the fellow and agree with the findings in the documentation.     Miranda Huston MD, CAQ, CCD  [facility]  Sports Medicine and Bone Health  Answers for HPI/ROS submitted by the patient on 11/7/2018   PHQ-2 Score: 0

## 2020-02-08 ENCOUNTER — HEALTH MAINTENANCE LETTER (OUTPATIENT)
Age: 50
End: 2020-02-08

## 2022-01-03 NOTE — TELEPHONE ENCOUNTER
DIAGNOSIS: (R) Shoulder / No Imaging    APPOINTMENT DATE: 2.22.22   NOTES STATUS DETAILS   OFFICE NOTE from referring provider N/A    OFFICE NOTE from other specialist N/A L shoulder records internal   DISCHARGE SUMMARY from hospital N/A    DISCHARGE REPORT from the ER N/A    OPERATIVE REPORT N/A    EMG report N/A    MEDICATION LIST Internal    MRI N/A    DEXA (osteoporosis/bone health) N/A    CT SCAN N/A    XRAYS (IMAGES & REPORTS) N/A

## 2022-01-27 DIAGNOSIS — M25.511 RIGHT SHOULDER PAIN: Primary | ICD-10-CM

## 2022-02-02 ENCOUNTER — ANCILLARY PROCEDURE (OUTPATIENT)
Dept: GENERAL RADIOLOGY | Facility: CLINIC | Age: 52
End: 2022-02-02
Attending: FAMILY MEDICINE
Payer: COMMERCIAL

## 2022-02-02 ENCOUNTER — OFFICE VISIT (OUTPATIENT)
Dept: ORTHOPEDICS | Facility: CLINIC | Age: 52
End: 2022-02-02
Payer: COMMERCIAL

## 2022-02-02 VITALS — HEIGHT: 70 IN | BODY MASS INDEX: 27.77 KG/M2 | WEIGHT: 194 LBS

## 2022-02-02 DIAGNOSIS — M25.512 CHRONIC LEFT SHOULDER PAIN: Primary | ICD-10-CM

## 2022-02-02 DIAGNOSIS — G89.29 CHRONIC LEFT SHOULDER PAIN: Primary | ICD-10-CM

## 2022-02-02 DIAGNOSIS — G89.29 CHRONIC RIGHT SHOULDER PAIN: ICD-10-CM

## 2022-02-02 DIAGNOSIS — M25.512 LEFT SHOULDER PAIN: ICD-10-CM

## 2022-02-02 DIAGNOSIS — M25.511 RIGHT SHOULDER PAIN: ICD-10-CM

## 2022-02-02 DIAGNOSIS — M25.511 CHRONIC RIGHT SHOULDER PAIN: ICD-10-CM

## 2022-02-02 PROCEDURE — 73030 X-RAY EXAM OF SHOULDER: CPT | Mod: LT | Performed by: RADIOLOGY

## 2022-02-02 PROCEDURE — 73030 X-RAY EXAM OF SHOULDER: CPT | Mod: RT | Performed by: RADIOLOGY

## 2022-02-02 PROCEDURE — 99204 OFFICE O/P NEW MOD 45 MIN: CPT | Mod: GC | Performed by: FAMILY MEDICINE

## 2022-02-02 RX ORDER — LOSARTAN POTASSIUM 50 MG/1
75 TABLET ORAL
COMMUNITY
Start: 2021-05-24

## 2022-02-02 RX ORDER — PROPRANOLOL HYDROCHLORIDE 20 MG/1
20 TABLET ORAL
COMMUNITY
Start: 2021-05-24

## 2022-02-02 ASSESSMENT — MIFFLIN-ST. JEOR: SCORE: 1746.23

## 2022-02-02 NOTE — PROGRESS NOTES
Subjective:   Yoni Hernandez is a 51 year old male who presents in clinic with bilateral shoulder pain but right shoulder is the primary issue. Patient denies any tingling or numbness in shoulder.     Professional violinist.     Right shoulder has been worse in past 3-4 months. It progressively got worse to the point that he wasn't able to play through it. He was seen by his PCP at the beginning of January and had a subacromial injection. Between that and massage he has done a lot better. It isn't bothering him at all at rest and only slightly when he moves it just in the wrong way when he is playing the violin. He is being cautious with it and trying to avoid overhead movements. He does go to [employer] and hadn't gone for a while because of his pain. He was able to go three times this week without issue.     Left shoulder is okay. He also avoids some motions to not aggravate it but overall it is doing well, he just wants to know how it has progressed.     His pain is primarily deep and posterior on bother shoulders.     Background:   Date of injury: None   Duration of symptoms: 1-1.5 years but reports within the last 3-4 months pain has increased.   Mechanism of Injury: Chronic; Activity Related, over use from playing violin.   Intensity: 2/10 at rest, 9/10 with activity   Aggravating factors: shoulder motions above 90 degrees   Relieving Factors: activity modification, CSI of Right GH Joint and massage therapy.   Prior Evaluation: Seen with PCP on 1/5/22 and received a right GH injection.       PAST MEDICAL, SOCIAL, SURGICAL AND FAMILY HISTORY: Past medical, surgical, family and social history was reviewed and unchanged since last visit.  ALLERGIES: He is allergic to sulfa drugs.    CURRENT MEDICATIONS: He has a current medication list which includes the following prescription(s): bupropion, losartan, propranolol, and ibuprofen, and the following Facility-Administered Medications: lidocaine and triamcinolone.  "       Exam:   Ht 1.778 m (5' 10\")   Wt 88 kg (194 lb)   BMI 27.84 kg/m       CONSTITUTIONAL: healthy, alert and no distress  HEAD: Normocephalic. No masses, lesions, tenderness or abnormalities  SKIN: no suspicious lesions or rashes  GAIT: normal  NEUROLOGIC: Non-focal  PSYCHIATRIC: affect normal/bright and mentation appears normal.    MUSCULOSKELETAL:   SHOULDER: Bilateral  Musculoskeletal - shoulder  - inspection: normal bone and joint alignment, no obvious deformity, no AC step-off, visible muscle spasm of right trapezius and levator with superior and anterior scapula compared to contralateral side without inferior winging  - palpation:normal clavicle, non-tender AC, posterior tenderness, hypertonicity and spasm of right trapezius and levator  - ROM: full range of motion bilaterally active and passively of flexion and extension, reduced internal and external rotation bilaterally, however right shoulder blade moving more to allow for full flexion  - strength: 5/5  strength, 5/5 in all shoulder planes  - special tests:  (-) Speed's  (+) Neer  (-) Hawkin's  (+) Alan  (-) Summit's  (-) apprehension  (-) subscap lift-off  Neuro  - no sensory or motor deficit, grossly normal coordination, normal muscle tone  Skin  - no ecchymosis, erythema, warmth, or induration, no obvious rash       Assessment/Plan:   Bilateral Shoulder Pain: 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   - discussed importance of avoiding provocative positions within [place] Theory for shoulder preservation  - discussed spectrum of treatment from conservative therapy vs joint replacement that he should continue with conservative treatment as long as he is able to meaningful play the violin  - referral for PT  - follow up as needed, can repeat CSI in either shoulder, has had significant benefit from " previous subacromial injections bilaterally at different points     X-RAY INTERPRETATION:   X-Ray of the Right Shoulder: 3-view, ap, y, axillary  ordered and interpreted in the office today was positive for degenerative changes with joint space narrowing and osteophyte formation.   X-Ray of the Left Shoulder: 3-view, ap, y, axillary ordered and interpreted in the office today was positive for degenerative changes significantly progressed since previous with worsening in joint space narrowing and osteophyte formation.     The patient was seen by and discussed with Dr.Suzanne TASHA Huston MD, CAQ, CCD    Luisa Hunter MD  Primary Care Sports Medicine Fellow

## 2022-02-02 NOTE — LETTER
2/2/2022      RE: Yoni Hernandez  [address] [place] [address]        Subjective:   Yoni Hernandez is a 51 year old male who presents in clinic with bilateral shoulder pain but right shoulder is the primary issue. Patient denies any tingling or numbness in shoulder.     Professional violinist.     Right shoulder has been worse in past 3-4 months. It progressively got worse to the point that he wasn't able to play through it. He was seen by his PCP at the beginning of January and had a subacromial injection. Between that and massage he has done a lot better. It isn't bothering him at all at rest and only slightly when he moves it just in the wrong way when he is playing the violin. He is being cautious with it and trying to avoid overhead movements. He does go to [employer] and hadn't gone for a while because of his pain. He was able to go three times this week without issue.     Left shoulder is okay. He also avoids some motions to not aggravate it but overall it is doing well, he just wants to know how it has progressed.     His pain is primarily deep and posterior on bother shoulders.     Background:   Date of injury: None   Duration of symptoms: 1-1.5 years but reports within the last 3-4 months pain has increased.   Mechanism of Injury: Chronic; Activity Related, over use from playing violin.   Intensity: 2/10 at rest, 9/10 with activity   Aggravating factors: shoulder motions above 90 degrees   Relieving Factors: activity modification, CSI of Right GH Joint and massage therapy.   Prior Evaluation: Seen with PCP on 1/5/22 and received a right GH injection.       PAST MEDICAL, SOCIAL, SURGICAL AND FAMILY HISTORY: Past medical, surgical, family and social history was reviewed and unchanged since last visit.  ALLERGIES: He is allergic to sulfa drugs.    CURRENT MEDICATIONS: He has a current medication list which includes the following prescription(s): bupropion, losartan, propranolol, and ibuprofen, and the  "following Facility-Administered Medications: lidocaine and triamcinolone.        Exam:   Ht 1.778 m (5' 10\")   Wt 88 kg (194 lb)   BMI 27.84 kg/m       CONSTITUTIONAL: healthy, alert and no distress  HEAD: Normocephalic. No masses, lesions, tenderness or abnormalities  SKIN: no suspicious lesions or rashes  GAIT: normal  NEUROLOGIC: Non-focal  PSYCHIATRIC: affect normal/bright and mentation appears normal.    MUSCULOSKELETAL:   SHOULDER: Bilateral  Musculoskeletal - shoulder  - inspection: normal bone and joint alignment, no obvious deformity, no AC step-off, visible muscle spasm of right trapezius and levator with superior and anterior scapula compared to contralateral side without inferior winging  - palpation:normal clavicle, non-tender AC, posterior tenderness, hypertonicity and spasm of right trapezius and levator  - ROM: full range of motion bilaterally active and passively of flexion and extension, reduced internal and external rotation bilaterally, however right shoulder blade moving more to allow for full flexion  - strength: 5/5  strength, 5/5 in all shoulder planes  - special tests:  (-) Speed's  (+) Neer  (-) Hawkin's  (+) Alan  (-) [place]'s  (-) apprehension  (-) subscap lift-off  Neuro  - no sensory or motor deficit, grossly normal coordination, normal muscle tone  Skin  - no ecchymosis, erythema, warmth, or induration, no obvious rash       Assessment/Plan:   Bilateral Shoulder Pain: 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   - discussed importance of avoiding provocative positions within [place] Theory for shoulder preservation  - discussed spectrum of treatment from conservative therapy vs joint replacement that he should continue with conservative treatment as long as he is able to meaningful play the violin  - referral for PT  - follow up as " needed, can repeat CSI in either shoulder, has had significant benefit from previous subacromial injections bilaterally at different points     X-RAY INTERPRETATION:   X-Ray of the Right Shoulder: 3-view, ap, y, axillary  ordered and interpreted in the office today was positive for degenerative changes with joint space narrowing and osteophyte formation.   X-Ray of the Left Shoulder: 3-view, ap, y, axillary ordered and interpreted in the office today was positive for degenerative changes significantly progressed since previous with worsening in joint space narrowing and osteophyte formation.     The patient was seen by and discussed with Dr.Suzanne TASHA Huston MD, CAQ, CCD    Luisa Hunter MD  Primary Care Sports Medicine Fellow      Attending Note:   I have examined this patient/images and have reviewed the clinical presentation and progress note with the fellow. I agree with the treatment plan as outlined. The plan was formulated with the fellow on the day of the patient's visit. I have reviewed all imaging with the fellow and agree with the findings in the documentation.     Miranda Huston MD, CAQ, CCD  [facility]  Sports Medicine and Bone Health

## 2022-02-03 NOTE — PROGRESS NOTES
Attending Note:   I have examined this patient/images and have reviewed the clinical presentation and progress note with the fellow. I agree with the treatment plan as outlined. The plan was formulated with the fellow on the day of the patient's visit. I have reviewed all imaging with the fellow and agree with the findings in the documentation.     Miranda Huston MD, CAQ, CCD  [facility]  Sports Medicine and Bone Health

## 2022-02-22 ENCOUNTER — PRE VISIT (OUTPATIENT)
Dept: ORTHOPEDICS | Facility: CLINIC | Age: 52
End: 2022-02-22
Payer: COMMERCIAL

## 2022-03-04 ENCOUNTER — THERAPY VISIT (OUTPATIENT)
Dept: PHYSICAL THERAPY | Facility: CLINIC | Age: 52
End: 2022-03-04
Attending: FAMILY MEDICINE
Payer: COMMERCIAL

## 2022-03-04 DIAGNOSIS — G89.29 CHRONIC LEFT SHOULDER PAIN: ICD-10-CM

## 2022-03-04 DIAGNOSIS — G89.29 CHRONIC RIGHT SHOULDER PAIN: ICD-10-CM

## 2022-03-04 DIAGNOSIS — M25.511 CHRONIC RIGHT SHOULDER PAIN: ICD-10-CM

## 2022-03-04 DIAGNOSIS — M25.512 CHRONIC LEFT SHOULDER PAIN: ICD-10-CM

## 2022-03-04 PROCEDURE — 97110 THERAPEUTIC EXERCISES: CPT | Mod: GP | Performed by: PHYSICAL THERAPIST

## 2022-03-04 PROCEDURE — 97161 PT EVAL LOW COMPLEX 20 MIN: CPT | Mod: GP | Performed by: PHYSICAL THERAPIST

## 2022-03-18 ENCOUNTER — THERAPY VISIT (OUTPATIENT)
Dept: PHYSICAL THERAPY | Facility: CLINIC | Age: 52
End: 2022-03-18
Payer: COMMERCIAL

## 2022-03-18 DIAGNOSIS — G89.29 CHRONIC RIGHT SHOULDER PAIN: ICD-10-CM

## 2022-03-18 DIAGNOSIS — G89.29 CHRONIC LEFT SHOULDER PAIN: ICD-10-CM

## 2022-03-18 DIAGNOSIS — M25.511 CHRONIC RIGHT SHOULDER PAIN: ICD-10-CM

## 2022-03-18 DIAGNOSIS — M25.512 CHRONIC LEFT SHOULDER PAIN: ICD-10-CM

## 2022-03-18 PROCEDURE — 97110 THERAPEUTIC EXERCISES: CPT | Mod: GP | Performed by: PHYSICAL THERAPIST

## 2022-03-18 PROCEDURE — 97140 MANUAL THERAPY 1/> REGIONS: CPT | Mod: GP | Performed by: PHYSICAL THERAPIST

## 2022-03-18 PROCEDURE — 97530 THERAPEUTIC ACTIVITIES: CPT | Mod: GP | Performed by: PHYSICAL THERAPIST

## 2022-03-18 NOTE — PROGRESS NOTES
Therapist Impression:   Good improvements in pain    GOALS: Violine    NEXT: Retry W, flexion in future    PTRX: handouts    Subjective:  Shoulder feels about the same at this point.  Will have a high practice for the next 3 weeks.    Objective:  SHOULDER RANGE OF MOTION  AROM Flexion Abduction ER Base Ext/IR or hand behind back angle   Left wnl wnl  Ant Drift: none 50 L2   Right wnl wnl pain  Ant Drift: none, shrug 45 L2   Pain: yes    SHOULDER STRENGTH  MMT Right Left   Flexion 5-/5  5-/5   Abduction 5-/5  5-/5   ER 5-/5 5-/5   IR 5/5 5/5

## 2022-05-20 NOTE — TELEPHONE ENCOUNTER
DIAGNOSIS: left knee pain   APPOINTMENT DATE: 5.26.22   NOTES STATUS DETAILS   OFFICE NOTE from referring provider Internal 5.20.22 [facility] Medical Advice   MEDICATION LIST Internal

## 2022-05-23 DIAGNOSIS — M25.562 LEFT KNEE PAIN: Primary | ICD-10-CM

## 2022-05-26 ENCOUNTER — PRE VISIT (OUTPATIENT)
Dept: ORTHOPEDICS | Facility: CLINIC | Age: 52
End: 2022-05-26
Payer: COMMERCIAL

## 2022-05-26 ENCOUNTER — OFFICE VISIT (OUTPATIENT)
Dept: ORTHOPEDICS | Facility: CLINIC | Age: 52
End: 2022-05-26
Payer: COMMERCIAL

## 2022-05-26 VITALS — HEIGHT: 70 IN | BODY MASS INDEX: 27.77 KG/M2 | WEIGHT: 194 LBS

## 2022-05-26 DIAGNOSIS — S89.92XA INJURY OF LEFT KNEE, INITIAL ENCOUNTER: Primary | ICD-10-CM

## 2022-05-26 PROCEDURE — 99213 OFFICE O/P EST LOW 20 MIN: CPT | Mod: GC | Performed by: FAMILY MEDICINE

## 2022-05-26 RX ORDER — DICLOFENAC SODIUM 75 MG/1
75 TABLET, DELAYED RELEASE ORAL 2 TIMES DAILY
Qty: 28 TABLET | Refills: 0 | Status: SHIPPED | OUTPATIENT
Start: 2022-05-26 | End: 2022-06-14

## 2022-05-26 NOTE — PROGRESS NOTES
"[facility]  Sports Medicine Clinic  Clinics and [facility] Center           SUBJECTIVE       Yoni Hernandez is a 52 year old male who left knee pain. He reports pain throughout the entire joint and swelling over quad tendon.     Patient reports a history of on and off knee pain going back a few years and especially with running. He \"tweaked\" his knee about 6 weeks ago on a brief run with his brother but otherwise the knee was feeling well enough to do [place] [employer]. 10 days ago he was lowering into a body weight lunge and felt a sharp pain in his left knee. He did not have immediate swelling and completed the class but in the 24 hours after the class his knee swelled up considerably. He took intermittent ibuprofen, iced, and rested which has helped the pain and swelling but he is still having clicking, popping, grinding, and snapping sensations in his knee, especially with stairs. No prior major injuries or surgeries to the knee.     Background:   Date of injury: None   Duration of symptoms: 1.5 years but worsening in the last 10 days  Mechanism of Injury: Chronic; Activity Related overuse.  Intensity: 0/10 at rest, 5/10 with activity   Aggravating factors: use of stairs and running.   Relieving Factors: rest, ice and NSAIDs  Prior Evaluation: None    PMH, Medications and Allergies were reviewed and updated as needed.    ROS:  As noted above otherwise negative.    Patient Active Problem List   Diagnosis     Tendinopathy of left rotator cuff     Tendinopathy of left biceps tendon     Chronic left shoulder pain     Chronic right shoulder pain       Current Outpatient Medications   Medication Sig Dispense Refill     buPROPion (WELLBUTRIN XL) 300 MG 24 hr tablet Take 300 mg by mouth       diclofenac (VOLTAREN) 75 MG EC tablet Take 1 tablet (75 mg) by mouth 2 times daily for 14 days 28 tablet 0     ibuprofen (ADVIL/MOTRIN) 200 MG tablet Take 400 mg by mouth       losartan (COZAAR) 50 MG tablet Take 75 mg by mouth  " "     propranolol (INDERAL) 20 MG tablet Take 20 mg by mouth              OBJECTIVE:       Vitals:   Vitals:    05/26/22 1357   Weight: 88 kg (194 lb)   Height: 1.778 m (5' 10\")     BMI: Body mass index is 27.84 kg/m .    General  - normal appearance, in no obvious distress  Musculoskeletal - left knee  - stance: normal gait without limp  - inspection: there is a moderate effusion noted   - palpation: no joint line tenderness, patella and patellar tendon non-tender  - ROM: 135 degrees flexion, -5 degrees extension which is minimally painful at the extreme of flexion  - strength: 5/5 in flexion, 5/5 in extension  - special tests:  (-) Lachman  (-) anterior drawer  (-) posterior drawer  (-) pivot shift  (-) Amy  (-) Thessaly  (-) varus at 0 and 30 degrees flexion  (-) valgus at 0 and 30 degrees flexion  (-) Boom s compression test  (-) patellar apprehension    Neuro  - no sensory or motor deficit, grossly normal coordination, normal muscle tone  Skin  - scattered ecchymosis on the bilateral shins    Ultrasound demonstrates intra-articular effusion most prominent within the medial patellar recess.       XRAY : left knee w/o significant chronic osseous abnormalities or evidence of acute osseous injury           ASSESSMENT and PLAN:     Yoni was seen today for consult.    Diagnoses and all orders for this visit:    Injury of left knee, initial encounter: Discussed with patient that his joint effusion suggests some degree of intra-articular damage. Overall the knee appears stable with normal ligamentous testing. I do believe most of his discomfort now is coming from the effusion but cannot rule out meniscal injury despite negative testing. Plan to pursue MRI to better evaluate the knee. In the interim, patient will utilize diclofenac, compression, ice, and rest. Plan for in person follow up after completion of MRI.    Options for treatment and/or follow-up care were reviewed with the patient was actively involved in " the decision making process. Patient verbalized understanding and was in agreement with the plan.    The patient was seen by and discussed with Dr.Suzanne TASHA Huston MD, CAQ, CCD    Rigoberto Luna DO PGY-4  [facility] Sports Medicine Fellow 2021-22

## 2022-05-26 NOTE — LETTER
"  5/26/2022    RE: Yoni Hernandez  [address]  [place] [address]     Dear Colleague,    Thank you for referring your patient, Yoni Hernandez, to the [facility] [place]. Please see a copy of my visit note below.    [facility]  Sports Medicine Clinic  Clin[facility] and [facility] Center           SUBJECTIVE       Yoni Hernandez is a 52 year old male who left knee pain. He reports pain throughout the entire joint and swelling over quad tendon.     Patient reports a history of on and off knee pain going back a few years and especially with running. He \"tweaked\" his knee about 6 weeks ago on a brief run with his brother but otherwise the knee was feeling well enough to do [employer]. 10 days ago he was lowering into a body weight lunge and felt a sharp pain in his left knee. He did not have immediate swelling and completed the class but in the 24 hours after the class his knee swelled up considerably. He took intermittent ibuprofen, iced, and rested which has helped the pain and swelling but he is still having clicking, popping, grinding, and snapping sensations in his knee, especially with stairs. No prior major injuries or surgeries to the knee.     Background:   Date of injury: None   Duration of symptoms: 1.5 years but worsening in the last 10 days  Mechanism of Injury: Chronic; Activity Related overuse.  Intensity: 0/10 at rest, 5/10 with activity   Aggravating factors: use of stairs and running.   Relieving Factors: rest, ice and NSAIDs  Prior Evaluation: None    PMH, Medications and Allergies were reviewed and updated as needed.    ROS:  As noted above otherwise negative.    Patient Active Problem List   Diagnosis     Tendinopathy of left rotator cuff     Tendinopathy of left biceps tendon     Chronic left shoulder pain     Chronic right shoulder pain       Current Outpatient Medications   Medication Sig Dispense Refill     buPROPion (WELLBUTRIN XL) 300 MG 24 hr tablet Take " "300 mg by mouth       diclofenac (VOLTAREN) 75 MG EC tablet Take 1 tablet (75 mg) by mouth 2 times daily for 14 days 28 tablet 0     ibuprofen (ADVIL/MOTRIN) 200 MG tablet Take 400 mg by mouth       losartan (COZAAR) 50 MG tablet Take 75 mg by mouth       propranolol (INDERAL) 20 MG tablet Take 20 mg by mouth              OBJECTIVE:       Vitals:   Vitals:    05/26/22 1357   Weight: 88 kg (194 lb)   Height: 1.778 m (5' 10\")     BMI: Body mass index is 27.84 kg/m .    General  - normal appearance, in no obvious distress  Musculoskeletal - left knee  - stance: normal gait without limp  - inspection: there is a moderate effusion noted   - palpation: no joint line tenderness, patella and patellar tendon non-tender  - ROM: 135 degrees flexion, -5 degrees extension which is minimally painful at the extreme of flexion  - strength: 5/5 in flexion, 5/5 in extension  - special tests:  (-) Lachman  (-) anterior drawer  (-) posterior drawer  (-) pivot shift  (-) Amy  (-) Thessaly  (-) varus at 0 and 30 degrees flexion  (-) valgus at 0 and 30 degrees flexion  (-) Boom s compression test  (-) patellar apprehension    Neuro  - no sensory or motor deficit, grossly normal coordination, normal muscle tone  Skin  - scattered ecchymosis on the bilateral shins    Ultrasound demonstrates intra-articular effusion most prominent within the medial patellar recess.       XRAY : left knee w/o significant chronic osseous abnormalities or evidence of acute osseous injury           ASSESSMENT and PLAN:     Yoni was seen today for consult.    Diagnoses and all orders for this visit:    Injury of left knee, initial encounter: Discussed with patient that his joint effusion suggests some degree of intra-articular damage. Overall the knee appears stable with normal ligamentous testing. I do believe most of his discomfort now is coming from the effusion but cannot rule out meniscal injury despite negative testing. Plan to pursue MRI to better " evaluate the knee. In the interim, patient will utilize diclofenac, compression, ice, and rest. Plan for in person follow up after completion of MRI.    Options for treatment and/or follow-up care were reviewed with the patient was actively involved in the decision making process. Patient verbalized understanding and was in agreement with the plan.    The patient was seen by and discussed with Dr.Suzanne TASHA Huston MD, CAQ, CCD    Rigoberto Luna DO PGY-4  [facility] Sports Medicine Fellow 2021-22        Attending Note:   I have examined this patient/images and have reviewed the clinical presentation and progress note with the fellow. I agree with the treatment plan as outlined. The plan was formulated with the fellow on the day of the patient's visit.   Miranda Huston MD, CAQ, CCD  [facility]  Sports Medicine and Bone Health

## 2022-05-26 NOTE — PROGRESS NOTES
Attending Note:   I have examined this patient/images and have reviewed the clinical presentation and progress note with the fellow. I agree with the treatment plan as outlined. The plan was formulated with the fellow on the day of the patient's visit.   Miranda Huston MD, CAQ, CCD  [facility]  Sports Medicine and Bone Health

## 2022-06-09 ENCOUNTER — ANCILLARY PROCEDURE (OUTPATIENT)
Dept: MRI IMAGING | Facility: CLINIC | Age: 52
End: 2022-06-09
Attending: STUDENT IN AN ORGANIZED HEALTH CARE EDUCATION/TRAINING PROGRAM
Payer: COMMERCIAL

## 2022-06-09 DIAGNOSIS — S89.92XA INJURY OF LEFT KNEE, INITIAL ENCOUNTER: ICD-10-CM

## 2022-06-09 PROCEDURE — 73721 MRI JNT OF LWR EXTRE W/O DYE: CPT | Mod: LT | Performed by: RADIOLOGY

## 2022-06-10 DIAGNOSIS — S89.92XA INJURY OF LEFT KNEE, INITIAL ENCOUNTER: ICD-10-CM

## 2022-06-13 NOTE — TELEPHONE ENCOUNTER
diclofenac (VOLTAREN) 75 MG EC tablet      Last Written Prescription Date:  2022  Last Fill Quantity: 28 tab,   # refills: 0  Last Office Visit : 2022  Future Office visit:  2022    Routing refill request to provider for review/approval because:  Refill needs review- continue to fill.  - last written 2022 take for 14 days Rx now ..  - future visit 2022

## 2022-06-14 RX ORDER — DICLOFENAC SODIUM 75 MG/1
75 TABLET, DELAYED RELEASE ORAL 2 TIMES DAILY
Qty: 28 TABLET | Refills: 0 | Status: SHIPPED | OUTPATIENT
Start: 2022-06-14 | End: 2022-07-29

## 2022-06-20 ENCOUNTER — OFFICE VISIT (OUTPATIENT)
Dept: ORTHOPEDICS | Facility: CLINIC | Age: 52
End: 2022-06-20
Payer: COMMERCIAL

## 2022-06-20 VITALS — WEIGHT: 194 LBS | BODY MASS INDEX: 27.77 KG/M2 | HEIGHT: 70 IN

## 2022-06-20 DIAGNOSIS — M25.511 CHRONIC RIGHT SHOULDER PAIN: Primary | ICD-10-CM

## 2022-06-20 DIAGNOSIS — G89.29 CHRONIC PAIN OF LEFT KNEE: ICD-10-CM

## 2022-06-20 DIAGNOSIS — G89.29 CHRONIC RIGHT SHOULDER PAIN: Primary | ICD-10-CM

## 2022-06-20 DIAGNOSIS — M25.562 CHRONIC PAIN OF LEFT KNEE: ICD-10-CM

## 2022-06-20 PROCEDURE — 20610 DRAIN/INJ JOINT/BURSA W/O US: CPT | Mod: RT | Performed by: FAMILY MEDICINE

## 2022-06-20 PROCEDURE — 99214 OFFICE O/P EST MOD 30 MIN: CPT | Mod: 25 | Performed by: FAMILY MEDICINE

## 2022-06-20 RX ORDER — LIDOCAINE HYDROCHLORIDE 10 MG/ML
9 INJECTION, SOLUTION EPIDURAL; INFILTRATION; INTRACAUDAL; PERINEURAL
Status: SHIPPED | OUTPATIENT
Start: 2022-06-20

## 2022-06-20 RX ORDER — TRIAMCINOLONE ACETONIDE 40 MG/ML
40 INJECTION, SUSPENSION INTRA-ARTICULAR; INTRAMUSCULAR
Status: SHIPPED | OUTPATIENT
Start: 2022-06-20

## 2022-06-20 RX ADMIN — TRIAMCINOLONE ACETONIDE 40 MG: 40 INJECTION, SUSPENSION INTRA-ARTICULAR; INTRAMUSCULAR at 14:19

## 2022-06-20 RX ADMIN — LIDOCAINE HYDROCHLORIDE 9 ML: 10 INJECTION, SOLUTION EPIDURAL; INFILTRATION; INTRACAUDAL; PERINEURAL at 14:19

## 2022-06-20 NOTE — NURSING NOTE
[address] [address]  Dept: [phone]  ______________________________________________________________________________    Patient: Yoni Hernandez   : 1970   MRN: [id number]   2022    INVASIVE PROCEDURE SAFETY CHECKLIST    Date: 22   Procedure: right subacromial CSI Kenalog  Patient Name: Yoni Hernandez  MRN: [id number]  YOB: 1970    Action: Complete sections as appropriate. Any discrepancy results in a HARD COPY until resolved.     PRE PROCEDURE:  Patient ID verified with 2 identifiers (name and  or MRN): Yes  Procedure and site verified with patient/designee (when able): Yes  Accurate consent documentation in medical record: Yes  H&P (or appropriate assessment) documented in medical record: Yes  H&P must be up to 20 days prior to procedure and updates within 24 hours of procedure as applicable: NA  Relevant diagnostic and radiology test results appropriately labeled and displayed as applicable: Yes  Procedure site(s) marked with provider initials: NA    TIMEOUT:  Time-Out performed immediately prior to starting procedure, including verbal and active participation of all team members addressing the following:Yes  * Correct patient identify  * Confirmed that the correct side and site are marked  * An accurate procedure consent form  * Agreement on the procedure to be done  * Correct patient position  * Relevant images and results are properly labeled and appropriately displayed  * The need to administer antibiotics or fluids for irrigation purposes during the procedure as applicable   * Safety precautions based on patient history or medication use    DURING PROCEDURE: Verification of correct person, site, and procedures any time the responsibility for care of the patient is transferred to another member of the care team.       Prior to injection, verified patient identity using patient's name and date of birth.  Due to  injection administration, patient instructed to remain in clinic for 15 minutes  afterwards, and to report any adverse reaction to me immediately.    Bursa injection was performed.      Drug Amount Wasted:  None.  Vial/Syringe: Single dose vial  Expiration Date:  3/1/24      Wood Holland, [facility]  June 20, 2022

## 2022-06-20 NOTE — PROGRESS NOTES
"S: f/u MRI knee.  Knee pain is down to 1/10 and it feeling better.    -Diclofenac 75 mg bid without side effects.   -Swelling is still present but improving.  Using tubigrip on his knee and that helps as well.   -Still notes medial knee discomfort and a lot of grinding and popping.       #2 R shoulder:  Previous CSI by his PCP in Jan 2022 that helped a lot.  His R shoulder flared up for unknown reasons.  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  No trauma.  No change in activities.  It isn't as bad now but still hurts.  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      Current Outpatient Medications   Medication     buPROPion (WELLBUTRIN XL) 300 MG 24 hr tablet     diclofenac (VOLTAREN) 75 MG EC tablet     ibuprofen (ADVIL/MOTRIN) 200 MG tablet     losartan (COZAAR) 50 MG tablet     propranolol (INDERAL) 20 MG tablet     Current Facility-Administered Medications   Medication     lidocaine 1 % injection 4 mL     triamcinolone acetonide (KENALOG-40) injection 40 mg           O:  NAD  Ht 1.778 m (5' 10\")   Wt 88 kg (194 lb)   BMI 27.84 kg/m        LEFT knee:  Mild-moderate effusion  FROM   +Amy's  +Medial jt line ttp  Nttp over the medial and lateral patellar facets  PF crepitus noted  +Baker's cyst without ttp, calf is nttp      R shoulder:    FROM with pain at 150-180 with adb and flexion  +Alan's for pain and weakness, IR resisted strength testing 4/5 compared to L 5/5  Nttp over the AC joint or the SCJ  +Hawkin's and Neer's              Narrative & Impression   MR left knee without contrast 6/9/2022 8:01 AM     Techniques: Multiplanar multisequence imaging of the left knee was  obtained without administration of intra-articular or intravenous  contrast using routing protocol.     History: Knee trauma, meniscal/ligament injury suspected, xray done  (Age >= 1y); Injury of left knee, initial encounter     Additional History from EMR: Tweaked knee several weeks ago during " a  run. Clicking, popping, snapping sensations especially during stairs.     Comparison: Radiograph 5/26/2022     Findings:     MENISCI:  Medial meniscus: Approximately 5 mm hypointense structure adjacent to  the posterior horn root ligament of the medial meniscus, appearing to  continue with posterior root ligament, likely representing inverted  meniscal flap from the partial posterior root ligament tear. Otherwise  intact.  Lateral meniscus: Prominent anterior and medial extension of lateral  meniscus posterior root, presumably anatomic variant. Otherwise,  intact.     LIGAMENTS  Cruciate ligaments: Intact.  Medial supporting structures: Periligamentous edema around the tibial  collateral ligament and posterior oblique ligament, consistent with  low-grade sprain. High-grade sprain/possible tearing meniscofemoral  ligament proximally. Moderate grade sprain meniscotibial ligament.  Small tibial collateral ligament bursal fluid.  Lateral supporting structures: Intact. Sprain anterolateral ligament.     EXTENSOR MECHANISM  Intact. Focal edema at the superolateral aspect of Hoffa's fat pad.  Insall-Salvati ratio measures 1.32, within normal limit. Tibial  tuberosity to trochlear groove distance measures 10 mm, within normal  limit. No patellar tilt or lateral subluxation.     FLUID  Moderate joint effusion. Large popliteal cyst, leaking caudally. Small  tibial collateral ligament bursal fluid.     OSSEOUS and ARTICULAR STRUCTURES  Bones: No fracture, contusion, or osseous lesion is seen.     Patellofemoral compartment: Multiple focal chondral fissuring of the  patella including full-thickness fissure with subchondral edema-like  marrow signal intensity at the patellar median ridge.     Medial compartment: Low to moderate grade chondral loss of the central  weightbearing surface of medial femoral condyle with possible  delamination component. No subchondral osseous abnormality.     Lateral compartment: No high-grade  hyaline cartilage disease.                                                                      Impression:  1. Small partial tear of the posterior root ligament of the medial  meniscus with associated meniscal flap.  2. Sprain medial supporting structures including high-grade  sprain/possible tearing meniscofemoral ligament proximally.  3. Sprain anterolateral ligament.  4. Query patellar tendon-lateral femoral condyle friction syndrome.  Normal TT:TG, Insall-Salvati ratio.  5. Grade IV chondromalacia patellofemoral compartment and grade II/III  chondromalacia medial compartment with possible delamination  component.  6. Large popliteal cyst, leaking caudally.  7. Moderate joint effusion.     I have personally reviewed the examination and initial interpretation  and I agree with the findings.     JEEVAN ALYSSA         SYSTEM ID:  [id number]     Large Joint Injection: R subacromial bursa    Date/Time: 6/20/2022 2:19 PM  Performed by: Miranda Huston MD  Authorized by: Miranda Huston MD     Indications:  Pain  Needle Size:  22 G  Guidance: landmark guided    Approach:  Posterolateral  Location:  Shoulder      Site:  R subacromial bursa  Medications:  40 mg triamcinolone 40 MG/ML; 9 mL lidocaine (PF) 1 %  Outcome:  Tolerated well, no immediate complications  Procedure discussed: discussed risks, benefits, and alternatives    Consent Given by:  Patient  Timeout: timeout called immediately prior to procedure    Prep: patient was prepped and draped in usual sterile fashion     Wood Holland ATC on 6/20/2022 at 2:20 PM      A:  L knee patellofemoral and medial compartment chrondromalacia (discussed as early OA with the patient)  Medial mensical root tear  ALL possible tear  Popiteal Cyst  R shoulder rotator cuff tendinopathy    P:  Discussed the imaging and reviewed the report with the imaging of the knee  Given the medial meniscal root tear, I would like him to see Dr. Gallardo or Mer  to discuss that further.    Continue diclofenac for an additional two weeks with meals and then switch to PRN  Continue compression  Ok to bike ride ((easy, no hill profiles and if that goes well Elliptical)  Will need PT but await  Input from Dr. Gallardo or Mer regarding the root tear      2. CSI to the R subacromial space  -See procedure note.    -Monitor symptoms and if not improving will need a shoulder MRI.      R shoulder subacromial space corticosteroid injection  After risks, benefits and complications of steroid injection were discussed with the patient he elected to proceed.  Using sterile technique, the area was first prepped with chloraprep.  Topical ethyl chloride was used as local.  A 22 gauge, 1 1/2 inch needle was used to inject 40 mg kenalong and 9cc of 1% lidocaine each into the subacromial space using a posterolateral approach on the right.  Pt.  tolerated the procedure well without complications.  Some improvement in rom and pain was noted immediately following the injection. p injection instructions given.        Miranda Huston MD, CAQ, FACSM, CCD  [facility]  Sports Medicine and Bone Health  Team Physician;  Athletics

## 2022-06-20 NOTE — LETTER
"   6/20/2022      RE: Yoni Hernandez  [address]  [place] [address]     Dear Colleague,    Thank you for referring your patient, Yoni Hernandez, to the [facility] [place]. Please see a copy of my visit note below.    S: f/u MRI knee.  Knee pain is down to 1/10 and it feeling better.    -Diclofenac 75 mg bid without side effects.   -Swelling is still present but improving.  Using tubigrip on his knee and that helps as well.   -Still notes medial knee discomfort and a lot of grinding and popping.       #2 R shoulder:  Previous CSI by his PCP in Jan 2022 that helped a lot.  His R shoulder flared up for unknown reasons.  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  No trauma.  No change in activities.  It isn't as bad now but still hurts.  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      Current Outpatient Medications   Medication     buPROPion (WELLBUTRIN XL) 300 MG 24 hr tablet     diclofenac (VOLTAREN) 75 MG EC tablet     ibuprofen (ADVIL/MOTRIN) 200 MG tablet     losartan (COZAAR) 50 MG tablet     propranolol (INDERAL) 20 MG tablet     Current Facility-Administered Medications   Medication     lidocaine 1 % injection 4 mL     triamcinolone acetonide (KENALOG-40) injection 40 mg           O:  NAD  Ht 1.778 m (5' 10\")   Wt 88 kg (194 lb)   BMI 27.84 kg/m        LEFT knee:  Mild-moderate effusion  FROM   +Amy's  +Medial jt line ttp  Nttp over the medial and lateral patellar facets  PF crepitus noted  +Baker's cyst without ttp, calf is nttp      R shoulder:    FROM with pain at 150-180 with adb and flexion  +Alan's for pain and weakness, IR resisted strength testing 4/5 compared to L 5/5  Nttp over the AC joint or the SCJ  +Hawkin's and Neer's              Narrative & Impression   MR left knee without contrast 6/9/2022 8:01 AM     Techniques: Multiplanar multisequence imaging of the left knee was  obtained without administration of " intra-articular or intravenous  contrast using routing protocol.     History: Knee trauma, meniscal/ligament injury suspected, xray done  (Age >= 1y); Injury of left knee, initial encounter     Additional History from EMR: Tweaked knee several weeks ago during a  run. Clicking, popping, snapping sensations especially during stairs.     Comparison: Radiograph 5/26/2022     Findings:     MENISCI:  Medial meniscus: Approximately 5 mm hypointense structure adjacent to  the posterior horn root ligament of the medial meniscus, appearing to  continue with posterior root ligament, likely representing inverted  meniscal flap from the partial posterior root ligament tear. Otherwise  intact.  Lateral meniscus: Prominent anterior and medial extension of lateral  meniscus posterior root, presumably anatomic variant. Otherwise,  intact.     LIGAMENTS  Cruciate ligaments: Intact.  Medial supporting structures: Periligamentous edema around the tibial  collateral ligament and posterior oblique ligament, consistent with  low-grade sprain. High-grade sprain/possible tearing meniscofemoral  ligament proximally. Moderate grade sprain meniscotibial ligament.  Small tibial collateral ligament bursal fluid.  Lateral supporting structures: Intact. Sprain anterolateral ligament.     EXTENSOR MECHANISM  Intact. Focal edema at the superolateral aspect of Hoffa's fat pad.  Insall-Salvati ratio measures 1.32, within normal limit. Tibial  tuberosity to trochlear groove distance measures 10 mm, within normal  limit. No patellar tilt or lateral subluxation.     FLUID  Moderate joint effusion. Large popliteal cyst, leaking caudally. Small  tibial collateral ligament bursal fluid.     OSSEOUS and ARTICULAR STRUCTURES  Bones: No fracture, contusion, or osseous lesion is seen.     Patellofemoral compartment: Multiple focal chondral fissuring of the  patella including full-thickness fissure with subchondral edema-like  marrow signal intensity at the  patellar median ridge.     Medial compartment: Low to moderate grade chondral loss of the central  weightbearing surface of medial femoral condyle with possible  delamination component. No subchondral osseous abnormality.     Lateral compartment: No high-grade hyaline cartilage disease.                                                                      Impression:  1. Small partial tear of the posterior root ligament of the medial  meniscus with associated meniscal flap.  2. Sprain medial supporting structures including high-grade  sprain/possible tearing meniscofemoral ligament proximally.  3. Sprain anterolateral ligament.  4. Query patellar tendon-lateral femoral condyle friction syndrome.  Normal TT:TG, Insall-Salvati ratio.  5. Grade IV chondromalacia patellofemoral compartment and grade II/III  chondromalacia medial compartment with possible delamination  component.  6. Large popliteal cyst, leaking caudally.  7. Moderate joint effusion.     I have personally reviewed the examination and initial interpretation  and I agree with the findings.     JEEVAN SHAH         SYSTEM ID:  [id number]     Large Joint Injection: R subacromial bursa    Date/Time: 6/20/2022 2:19 PM  Performed by: Miranda Huston MD  Authorized by: Miranda Huston MD     Indications:  Pain  Needle Size:  22 G  Guidance: landmark guided    Approach:  Posterolateral  Location:  Shoulder      Site:  R subacromial bursa  Medications:  40 mg triamcinolone 40 MG/ML; 9 mL lidocaine (PF) 1 %  Outcome:  Tolerated well, no immediate complications  Procedure discussed: discussed risks, benefits, and alternatives    Consent Given by:  Patient  Timeout: timeout called immediately prior to procedure    Prep: patient was prepped and draped in usual sterile fashion     Wood Holland ATC on 6/20/2022 at 2:20 PM      A:  L knee patellofemoral and medial compartment chrondromalacia (discussed as early OA with the  patient)  Medial mensical root tear  ALL possible tear  Popiteal Cyst  R shoulder rotator cuff tendinopathy    P:  Discussed the imaging and reviewed the report with the imaging of the knee  Given the medial meniscal root tear, I would like him to see Dr. Gallardo or Mer to discuss that further.    Continue diclofenac for an additional two weeks with meals and then switch to PRN  Continue compression  Ok to bike ride ((easy, no hill profiles and if that goes well Elliptical)  Will need PT but await  Input from Dr. Gallardo or Mer regarding the root tear      2. CSI to the R subacromial space  -See procedure note.    -Monitor symptoms and if not improving will need a shoulder MRI.      R shoulder subacromial space corticosteroid injection  After risks, benefits and complications of steroid injection were discussed with the patient he elected to proceed.  Using sterile technique, the area was first prepped with chloraprep.  Topical ethyl chloride was used as local.  A 22 gauge, 1 1/2 inch needle was used to inject 40 mg kenalong and 9cc of 1% lidocaine each into the subacromial space using a posterolateral approach on the right.  Pt.  tolerated the procedure well without complications.  Some improvement in rom and pain was noted immediately following the injection. p injection instructions given.        Miranda Huston MD, CAQ, FACSM, CCD  [facility]  Sports Medicine and Bone Health  Team Physician;  Athletics

## 2022-06-22 ENCOUNTER — OFFICE VISIT (OUTPATIENT)
Dept: ORTHOPEDICS | Facility: CLINIC | Age: 52
End: 2022-06-22
Payer: COMMERCIAL

## 2022-06-22 VITALS — WEIGHT: 194 LBS | BODY MASS INDEX: 27.77 KG/M2 | HEIGHT: 70 IN

## 2022-06-22 DIAGNOSIS — M67.912 TENDINOPATHY OF LEFT ROTATOR CUFF: ICD-10-CM

## 2022-06-22 DIAGNOSIS — M25.562 CHRONIC PAIN OF LEFT KNEE: Primary | ICD-10-CM

## 2022-06-22 DIAGNOSIS — G89.29 CHRONIC PAIN OF LEFT KNEE: Primary | ICD-10-CM

## 2022-06-22 DIAGNOSIS — M25.512 CHRONIC LEFT SHOULDER PAIN: ICD-10-CM

## 2022-06-22 DIAGNOSIS — G89.29 CHRONIC LEFT SHOULDER PAIN: ICD-10-CM

## 2022-06-22 DIAGNOSIS — M25.511 CHRONIC RIGHT SHOULDER PAIN: ICD-10-CM

## 2022-06-22 DIAGNOSIS — M67.922 TENDINOPATHY OF LEFT BICEPS TENDON: ICD-10-CM

## 2022-06-22 DIAGNOSIS — G89.29 CHRONIC RIGHT SHOULDER PAIN: ICD-10-CM

## 2022-06-22 PROCEDURE — 99203 OFFICE O/P NEW LOW 30 MIN: CPT | Mod: 25 | Performed by: ORTHOPAEDIC SURGERY

## 2022-06-22 PROCEDURE — 20610 DRAIN/INJ JOINT/BURSA W/O US: CPT | Mod: LT | Performed by: ORTHOPAEDIC SURGERY

## 2022-06-22 RX ORDER — TRIAMCINOLONE ACETONIDE 40 MG/ML
40 INJECTION, SUSPENSION INTRA-ARTICULAR; INTRAMUSCULAR
Status: SHIPPED | OUTPATIENT
Start: 2022-06-22

## 2022-06-22 RX ORDER — LIDOCAINE HYDROCHLORIDE 5 MG/ML
8 INJECTION, SOLUTION INFILTRATION; INTRAVENOUS
Status: SHIPPED | OUTPATIENT
Start: 2022-06-22

## 2022-06-22 RX ADMIN — LIDOCAINE HYDROCHLORIDE 8 ML: 5 INJECTION, SOLUTION INFILTRATION; INTRAVENOUS at 10:34

## 2022-06-22 RX ADMIN — TRIAMCINOLONE ACETONIDE 40 MG: 40 INJECTION, SUSPENSION INTRA-ARTICULAR; INTRAMUSCULAR at 10:34

## 2022-06-22 NOTE — PROGRESS NOTES
Patient seen and examined with the resident.     Assesment: Medial compartment chondrosis/patellofemoral chondrosis    Small medial meniscus tear    Significant glenohumeral chondrosis    Plan: I long discussion with the patient.  I reviewed his diagnosis.  We also discussed his shoulder little bit as he recently had an injection in his shoulder.  He would like to discuss with one of our shoulder surgeons regarding his glenohumeral arthritis.  I am going to place a referral.    As far as his knee goes I told the patient that he does have a small tear of his meniscus as well as some areas of cartilage wear and tear.  I think the goal should be to maximize nonsurgical management.  I do not think there is any role for surgical intervention to ultimately change his future and it really should be thought of as a way to manage his symptoms.  We discussed pros and cons of surgery as well as nonsurgical intervention.  Anterior through combined decision making approach of elected to proceed with a corticosteroid injection to his knee.  He can continue take the oral diclofenac on an as-needed basis.  He should maximize his daily activities with things such as activity modification and a home therapy program.    After written informed consent obtained and signed, after sufficient prepping and sterile technique, 40 mg of kenalog and , 8 cc of 1% lidocaine were injected without complication into the left knee. The patient tolerated the injection well and a sterile dressing was applied.     I agree with history, physical and imaging as well as the assessment and plan as detailed by Dr. Pollack.         Large Joint Injection: L knee joint    Date/Time: 6/22/2022 10:34 AM  Performed by: Joseluis Del Angel MD  Authorized by: Joseluis Del Angel MD     Indications:  Pain and osteoarthritis  Needle Size:  22 G  Guidance: landmark guided    Approach:  Anterolateral  Location:  Knee      Medications:  40 mg  triamcinolone 40 MG/ML; 8 mL lidocaine (PF) 0.5 %  Outcome:  Tolerated well, no immediate complications  Procedure discussed: discussed risks, benefits, and alternatives    Consent Given by:  Patient  Timeout: timeout called immediately prior to procedure    Prep: patient was prepped and draped in usual sterile fashion

## 2022-06-22 NOTE — LETTER
6/22/2022         RE: Yoni Hernandez  [address]  [place] [address]        Dear Colleague,    Thank you for referring your patient, Yoni Hernandez, to the [facility] [place]. Please see a copy of my visit note below.    CHIEF CONCERN: Left knee pain    HISTORY:   The patient is a pleasant 52-year-old male who presents today for evaluation of left knee pain.  He is previously seen Dr. Huston for bilateral shoulder and knee pain.  Patient describes diffuse anterior pain about the knee with associated medial compartment pain for the last 6 months, however pain worsened approximately 6 weeks ago when he was doing lunges.  He cannot member specific injury, but states that his knee started hurting last December after running.  He describes swelling, catching type symptoms.  He has no history of specific trauma to the knee and has not had surgery on his knee.  He denies numbness, tingling or weakness about the extremity.  With regard to treatment, the patient has taken oral diclofenac gel and has modified his activities to reduce running, squats and lunges.  He is not in physical therapy, injections or topical medications.  He enjoys exercising and his goal is to get back to being active.    PAST MEDICAL HISTORY: (Reviewed with the patient and in the [facility] medical record)  1. Hypertension  2. Bilateral glenohumeral arthritis    PAST SURGICAL HISTORY: (Reviewed with the patient and in the [facility] medical record)  1. Fistulotomy    No personal history of problems with anesthesia, blood clots or bleeding disorder    MEDICATIONS: (Reviewed with the patient and in the [facility] medical record)    Notable medications include:   Current Outpatient Medications   Medication     buPROPion (WELLBUTRIN XL) 300 MG 24 hr tablet     diclofenac (VOLTAREN) 75 MG EC tablet     ibuprofen (ADVIL/MOTRIN) 200 MG tablet     losartan (COZAAR) 50 MG tablet     propranolol (INDERAL) 20 MG tablet     Current  Facility-Administered Medications   Medication     lidocaine (PF) (XYLOCAINE) 1 % injection 9 mL     lidocaine 1 % injection 4 mL     triamcinolone (KENALOG-40) injection 40 mg     triamcinolone acetonide (KENALOG-40) injection 40 mg     ALLERGIES: (Reviewed with the patient and in the [facility] medical record)  1. Sulfa drugs      SOCIAL HISTORY: (Reviewed with the patient and in the medical record)  --Tobacco: no  --Occupation: violinist for the [employer]  --Avocation/Sport: exercise    FAMILY HISTORY: (Reviewed with the patient and in the medical record)  -- No family history of bleeding, clotting, or difficulty with anesthesia    REVIEW OF SYSTEMS: (Reviewed with the patient and on the health intake form)  -- A comprehensive 10 point review of systems was conducted and is negative except as noted in the HPI    EXAM:     General: Awake, Alert and Oriented, No acute Distress. Articulate and Interactive    Body mass index is 27.84 kg/m .    Left Lower extremity :    Skin is Warm and Well perfused, no suggestion of infection.  No surgical incisions    Mild effusion about the knee    Knee range of motion 0 to 135 degrees without significant pain    No significant tenderness to palpation about the medial or lateral joint line, medial or lateral patellar facet.    Pain with Amy    Stable to varus and valgus stress at 0 and 30 degrees without pain.  Stable Lachman and posterior drawer testing    EHL/FHL/TA/GS 5/5    Sensation intact L3-S1    2+ Dorsalis Pedis Pulse    IMAGING:    Plain Radiographs: Bilateral knee x-rays 5/26/2022 were independently reviewed and demonstrate no significant osseous abnormality without evidence of fracture, dislocation or significant degenerative changes about the knee.  Overall preserved joint space in the medial, lateral and patellofemoral compartments.    MRI left knee 6/9/2022 independently reviewed and demonstrate effusion about the knee with chondromalacia in the  patellofemoral compartment and medial compartment.  Associated medial meniscus tear.  Evidence of Baker's cyst posteriorly.  No significant degenerative changes in the lateral compartment.  Intact lateral meniscus.  Intact PCL, ACL.     ASSESSMENT:  1. Left knee medial meniscus tear  2. Left knee medial compartment and patellofemoral arthritis    PLAN:  1. Discussed both nonoperative and surgical options available to the patient  2. Nonoperative management entails activity modification, oral/topical NSAIDs, PT, and corticosteroid injections  1. Patient interested in steroid injection and physical therapy today (see procedure note for details)  3. Briefly discussed knee arthroscopy with chondroplasty, partial medial meniscectomy, however this may only help his mechanical symptoms from the meniscus injury and may not provide long term relief given the degree of degenerative changes in the knee.  Ultimately, this would be an option should he fail conservative management and have ongoing mechanical symptoms.  4. Discussed the patient's bilateral glenohumeral osteoarthritis and symptoms.  He is interested in discussion with the shoulder surgeon regarding his options.  Referral placed for Dr. Garcia  5. Follow-up as needed the knee    The patient was seen and discussed with Dr. Del Angel, who agrees with the assessment and plan.    Juancarlos Pollack MD  Orthopaedic Surgery, PGY5      Patient seen and examined with the resident.     Assesment: Medial compartment chondrosis/patellofemoral chondrosis    Small medial meniscus tear    Significant glenohumeral chondrosis    Plan: I long discussion with the patient.  I reviewed his diagnosis.  We also discussed his shoulder little bit as he recently had an injection in his shoulder.  He would like to discuss with one of our shoulder surgeons regarding his glenohumeral arthritis.  I am going to place a referral.    As far as his knee goes I told the patient that he does have a small  tear of his meniscus as well as some areas of cartilage wear and tear.  I think the goal should be to maximize nonsurgical management.  I do not think there is any role for surgical intervention to ultimately change his future and it really should be thought of as a way to manage his symptoms.  We discussed pros and cons of surgery as well as nonsurgical intervention.  Anterior through combined decision making approach of elected to proceed with a corticosteroid injection to his knee.  He can continue take the oral diclofenac on an as-needed basis.  He should maximize his daily activities with things such as activity modification and a home therapy program.    After written informed consent obtained and signed, after sufficient prepping and sterile technique, 40 mg of kenalog and , 8 cc of 1% lidocaine were injected without complication into the left knee. The patient tolerated the injection well and a sterile dressing was applied.     I agree with history, physical and imaging as well as the assessment and plan as detailed by Dr. Pollack.         Large Joint Injection: L knee joint    Date/Time: 6/22/2022 10:34 AM  Performed by: Joseluis Del Angel MD  Authorized by: Joseluis Del Angel MD     Indications:  Pain and osteoarthritis  Needle Size:  22 G  Guidance: landmark guided    Approach:  Anterolateral  Location:  Knee      Medications:  40 mg triamcinolone 40 MG/ML; 8 mL lidocaine (PF) 0.5 %  Outcome:  Tolerated well, no immediate complications  Procedure discussed: discussed risks, benefits, and alternatives    Consent Given by:  Patient  Timeout: timeout called immediately prior to procedure    Prep: patient was prepped and draped in usual sterile fashion              Again, thank you for allowing me to participate in the care of your patient.        Sincerely,        Joseluis Del Angel MD

## 2022-06-22 NOTE — NURSING NOTE
[address] [address]  Dept: [phone]  ______________________________________________________________________________    Patient: Yoni Hernandez   : 1970   MRN: [id number]   2022    INVASIVE PROCEDURE SAFETY CHECKLIST    Date: 2022   Procedure: Left Knee joint injection with kenalog  Patient Name: Yoni Hernandez  MRN: [id number]  YOB: 1970    Action: Complete sections as appropriate. Any discrepancy results in a HARD COPY until resolved.     PRE PROCEDURE:  Patient ID verified with 2 identifiers (name and  or MRN): Yes  Procedure and site verified with patient/designee (when able): Yes  Accurate consent documentation in medical record: Yes  H&P (or appropriate assessment) documented in medical record: Yes  H&P must be up to 20 days prior to procedure and updates within 24 hours of procedure as applicable: NA  Relevant diagnostic and radiology test results appropriately labeled and displayed as applicable: NA  Procedure site(s) marked with provider initials: NA    TIMEOUT:  Time-Out performed immediately prior to starting procedure, including verbal and active participation of all team members addressing the following:Yes  * Correct patient identify  * Confirmed that the correct side and site are marked  * An accurate procedure consent form  * Agreement on the procedure to be done  * Correct patient position  * Relevant images and results are properly labeled and appropriately displayed  * The need to administer antibiotics or fluids for irrigation purposes during the procedure as applicable   * Safety precautions based on patient history or medication use    DURING PROCEDURE: Verification of correct person, site, and procedures any time the responsibility for care of the patient is transferred to another member of the care team.       Prior to injection, verified patient identity using patient's name and date of  birth.  Due to injection administration, patient instructed to remain in clinic for 15 minutes  afterwards, and to report any adverse reaction to me immediately.    Joint injection was performed.      Lido  Drug Amount Wasted:  Yes: 42 mg/ml   Vial/Syringe: Single dose vial  Expiration Date:  01/01/2026    Kenalog  Drug Amount Wasted: No  Vial/Syringe: Single dose vial  Expiration Date: 03/01/2024    Jo Taylor, ATC  June 22, 2022

## 2022-06-22 NOTE — PROGRESS NOTES
CHIEF CONCERN: Left knee pain    HISTORY:   The patient is a pleasant 52-year-old male who presents today for evaluation of left knee pain.  He is previously seen Dr. Hsuton for bilateral shoulder and knee pain.  Patient describes diffuse anterior pain about the knee with associated medial compartment pain for the last 6 months, however pain worsened approximately 6 weeks ago when he was doing lunges.  He cannot member specific injury, but states that his knee started hurting last December after running.  He describes swelling, catching type symptoms.  He has no history of specific trauma to the knee and has not had surgery on his knee.  He denies numbness, tingling or weakness about the extremity.  With regard to treatment, the patient has taken oral diclofenac gel and has modified his activities to reduce running, squats and lunges.  He is not in physical therapy, injections or topical medications.  He enjoys exercising and his goal is to get back to being active.    PAST MEDICAL HISTORY: (Reviewed with the patient and in the [facility] medical record)  1. Hypertension  2. Bilateral glenohumeral arthritis    PAST SURGICAL HISTORY: (Reviewed with the patient and in the [facility] medical record)  1. Fistulotomy    No personal history of problems with anesthesia, blood clots or bleeding disorder    MEDICATIONS: (Reviewed with the patient and in the [facility] medical record)    Notable medications include:   Current Outpatient Medications   Medication     buPROPion (WELLBUTRIN XL) 300 MG 24 hr tablet     diclofenac (VOLTAREN) 75 MG EC tablet     ibuprofen (ADVIL/MOTRIN) 200 MG tablet     losartan (COZAAR) 50 MG tablet     propranolol (INDERAL) 20 MG tablet     Current Facility-Administered Medications   Medication     lidocaine (PF) (XYLOCAINE) 1 % injection 9 mL     lidocaine 1 % injection 4 mL     triamcinolone (KENALOG-40) injection 40 mg     triamcinolone acetonide (KENALOG-40) injection 40 mg     ALLERGIES: (Reviewed with the  patient and in the [facility] medical record)  1. 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 Articulate and Interactive    Body mass index is 27.84 kg/m .    Left Lower extremity :    Skin is Warm and Well perfused, no suggestion of infection.  No surgical incisions    Mild effusion about the knee    Knee range of motion 0 to 135 degrees without significant pain    No significant tenderness to palpation about the medial or lateral joint line, medial or lateral patellar facet.    Pain with Amy    Stable to varus and valgus stress at 0 and 30 degrees without pain.  Stable Lachman and posterior drawer testing    EHL/FHL/TA/GS 5/5    Sensation intact L3-S1    2+ Dorsalis Pedis Pulse    IMAGING:    Plain Radiographs: Bilateral knee x-rays 5/26/2022 were independently reviewed and demonstrate no significant osseous abnormality without evidence of fracture, dislocation or significant degenerative changes about the knee.  Overall preserved joint space in the medial, lateral and patellofemoral compartments.    MRI left knee 6/9/2022 independently reviewed and demonstrate effusion about the knee with chondromalacia in the patellofemoral compartment and medial compartment.  Associated medial meniscus tear.  Evidence of Baker's cyst posteriorly.  No significant degenerative changes in the lateral compartment.  Intact lateral meniscus.  Intact PCL, ACL.     ASSESSMENT:  1. Left knee medial meniscus  tear  2. Left knee medial compartment and patellofemoral arthritis    PLAN:  1. Discussed both nonoperative and surgical options available to the patient  2. Nonoperative management entails activity modification, oral/topical NSAIDs, PT, and corticosteroid injections  1. Patient interested in steroid injection and physical therapy today (see procedure note for details)  3. Briefly discussed knee arthroscopy with chondroplasty, partial medial meniscectomy, however this may only help his mechanical symptoms from the meniscus injury and may not provide long term relief given the degree of degenerative changes in the knee.  Ultimately, this would be an option should he fail conservative management and have ongoing mechanical symptoms.  4. Discussed the patient's bilateral glenohumeral osteoarthritis and symptoms.  He is interested in discussion with the shoulder surgeon regarding his options.  Referral placed for Dr. Garcia  5. Follow-up as needed the knee    The patient was seen and discussed with Dr. Del Angel, who agrees with the assessment and plan.    Juancarlos Pollack MD  Orthopaedic Surgery, PGY5

## 2022-07-26 NOTE — TELEPHONE ENCOUNTER
DIAGNOSIS: Bilateral Chronic Shoulder Pain   APPOINTMENT DATE: 8/15/2022   NOTES STATUS DETAILS   OFFICE NOTE from referring provider [facility]:  6/22/22 - ORTHO OV with Dr. Del Angel   OFFICE NOTE from other specialist Care Everywhere / [facility]:  6/20/22 - SPORT OV with Dr. Huston    [facility]:  6/1/22, 1/5/22 - PCC OV with Dr. Vladimir Pineda - Rehab:  3/18/22 - PT OV with Grayson Concepcion PT   DISCHARGE SUMMARY from hospital N/A    DISCHARGE REPORT from the ER N/A    OPERATIVE REPORT N/A    MEDICATION LIST Internal    IMPLANT RECORD/STICKER N/A    LABS     CBC/DIFF Care Everywhere [facility]:  5/24/21 - CBC   CULTURES N/A    INJECTIONS DONE IN RADIOLOGY [facility]:  6/20/22 - Shoulder Injection  10/3/18 - Shoulder Injection   MRI N/A    CT SCAN N/A    XRAYS (IMAGES & REPORTS) [facility]:  2/2/22 - XR Shoulder, RT  2/2/22 - XR Shoulder, LT  10/3/18 - XR Shoulder, LT

## 2022-07-29 DIAGNOSIS — S89.92XA INJURY OF LEFT KNEE, INITIAL ENCOUNTER: ICD-10-CM

## 2022-07-29 RX ORDER — DICLOFENAC SODIUM 75 MG/1
75 TABLET, DELAYED RELEASE ORAL 2 TIMES DAILY
Qty: 62 TABLET | Refills: 1 | Status: SHIPPED | OUTPATIENT
Start: 2022-07-29 | End: 2022-10-21

## 2022-07-29 NOTE — TELEPHONE ENCOUNTER
diclofenac (VOLTAREN) 75 MG EC tablet  Last Written Prescription Date:   6/14/2022  Last Fill Quantity: 28,   # refills: 0  Last Office Visit :  6/20/2022  Future Office visit:   10/5/2022    Routing refill request to provider for review/approval because:  Needing update B/P AND Labs: AST/ALT/CBC/BMP      Peace Watkins RN  Central Triage Red Flags/Med Refills

## 2022-08-15 ENCOUNTER — PRE VISIT (OUTPATIENT)
Dept: ORTHOPEDICS | Facility: CLINIC | Age: 52
End: 2022-08-15

## 2022-09-19 ENCOUNTER — OFFICE VISIT (OUTPATIENT)
Dept: ORTHOPEDICS | Facility: CLINIC | Age: 52
End: 2022-09-19
Attending: ORTHOPAEDIC SURGERY
Payer: COMMERCIAL

## 2022-09-19 DIAGNOSIS — M67.922 TENDINOPATHY OF LEFT BICEPS TENDON: ICD-10-CM

## 2022-09-19 DIAGNOSIS — M19.011 ARTHRITIS OF BOTH GLENOHUMERAL JOINTS: Primary | ICD-10-CM

## 2022-09-19 DIAGNOSIS — M67.912 TENDINOPATHY OF LEFT ROTATOR CUFF: ICD-10-CM

## 2022-09-19 DIAGNOSIS — M19.012 ARTHRITIS OF BOTH GLENOHUMERAL JOINTS: Primary | ICD-10-CM

## 2022-09-19 DIAGNOSIS — G89.29 CHRONIC LEFT SHOULDER PAIN: ICD-10-CM

## 2022-09-19 DIAGNOSIS — M25.511 CHRONIC RIGHT SHOULDER PAIN: ICD-10-CM

## 2022-09-19 DIAGNOSIS — M25.512 CHRONIC LEFT SHOULDER PAIN: ICD-10-CM

## 2022-09-19 DIAGNOSIS — G89.29 CHRONIC RIGHT SHOULDER PAIN: ICD-10-CM

## 2022-09-19 PROCEDURE — 99213 OFFICE O/P EST LOW 20 MIN: CPT | Mod: GC | Performed by: ORTHOPAEDIC SURGERY

## 2022-09-19 NOTE — LETTER
9/19/2022         RE: Yoni Hernandez  [address]  [place] [address]        Dear Colleague,    Thank you for referring your patient, Yoni Hernandez, to the [facility] [place]. Please see a copy of my visit note below.    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  Patient states he has had chronic bilateral shoulder pain for several years.  Within the last year he has had episodic flares of bilateral shoulder pain requiring corticosteroid injections.  He has had good relief of his shoulder pain with injection use.  He denies any prior history of shoulder injuries or surgery.  He notes his pain is exacerbated with excessive use or overhead activities.  He does have some night pain.  He is also currently using diclofenac anti-inflammatory medications.  He is currently modifying his activities and no longer participates in horse therapy fitness classes due to concern for exacerbating his shoulder symptoms.  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  He denies any neck pain or numbness or tingling.    Past medical history:  No past medical history on file.    Past Surgical History:   Procedure Laterality Date     COLON SURGERY         Current Outpatient Medications   Medication Sig Dispense Refill     buPROPion (WELLBUTRIN XL) 300 MG 24 hr tablet Take 300 mg by mouth       diclofenac (VOLTAREN) 75 MG EC tablet Take 1 tablet (75 mg) by mouth 2 times daily 62 tablet 1     ibuprofen (ADVIL/MOTRIN) 200 MG tablet Take 400 mg by mouth       losartan (COZAAR) 50 MG tablet Take 75 mg by mouth       propranolol (INDERAL) 20 MG tablet Take 20 mg by mouth            Allergies   Allergen Reactions     Sulfa Drugs Rash       SOCIAL HISTORY:    Social History     Socioeconomic History     Marital status: Single      Spouse name: Not on file     Number of children: Not on file     Years of education: Not on file     Highest education level: Not on file   Occupational History     Not on file   Tobacco Use     Smoking status: Never Smoker     Smokeless tobacco: Never Used   Substance and Sexual Activity     Alcohol use: Yes     Alcohol/week: 14.0 standard drinks     Types: 14 Glasses of wine per week     Drug use: Not on file     Sexual activity: Not on file   Other Topics Concern     Not on file   Social History Narrative     Not on file     Social Determinants of Health     Financial Resource Strain: Not on file   Food Insecurity: Not on file   Transportation Needs: Not on file   Physical Activity: Not on file   Stress: Not on file   Social Connections: Not on file   Intimate Partner Violence: Not on file   Housing Stability: Not on file   Occupation: Violinist [id number]  Enjoys [employer]    FAMILY HISTORY: Reviewed in EMR      REVIEW OF SYSTEMS: Positive for that noted in past medical history and history of present illness and otherwise reviewed in EMR     PHYSICAL EXAM:    Adult male in no acute distress. Articulates and communicates with normal affect.  Respirations even and unlabored  Focused bilateral upper extremity exam:   Skin intact, no erythema or prior incisions.  No tenderness palpation of AC joint or bicipital groove bilaterally.    Range of motion with 180 degrees forward flexion bilaterally, 30 degrees external rotation of the right and 50 degrees in the left, internal rotation L3 bilaterally.  Rotator cuff strength testing noted for 5/5 strength bilaterally with testing of the supraspinatus, infraspinatus and subscapularis.  Sensation intact to light touch on the lateral aspect with deltoid, median, radial, ulnar nerve distributions bilaterally.  Fires deltoid bilaterally.  Fires EPL FPL FDP and interossei bilaterally.  Hands warm well perfused.    IMAGING:  X-rays of the right shoulder 2/2/2022 were  independently reviewed and demonstrate moderate glenohumeral arthritic changes with developing inferior proximal humeral osteophyte and narrowing of the glenohumeral joint space.  Axillary view does not demonstrate any posterior glenoid wear or humeral subluxation. There is no proximal humeral migration.  No fracture or dislocation.      X-rays of the left shoulder 2022 independently reviewed and demonstrate moderate glenohumeral arthritic changes more advanced than the contralateral right shoulder.  There is a large inferior proximal humeral osteophyte and narrowing of the glenohumeral joint space.  There is no evidence of posterior glenoid wear or humeral head subluxation.  There is no proximal humeral migration. No fracture or dislocation.    ASSESSMENT:    53 yo M with the followin. 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  Should he experience a flareup of shoulder pain it would be reasonable for him to receive repeat corticosteroid injections.  We instructed him to call the clinic should this occur and we will schedule him to receive either right or left ultrasound-guided glenohumeral corticosteroid injection.  We recommend following up for repeat x-rays of bilateral shoulders in 1 year to continue to monitor his glenohumeral arthritis and evaluate for any progression of posterior glenoid wear.  Should his symptoms significantly worsen and limit his quality of life or ability to perform his job, or he develops significant glenoid bone loss, the neck step in treatment would then be anamtomic total shoulder arthroplasty.  We discussed the expectations and function after total shoulder arthroplasty and all questions were answered today's visit.    Patient seen, evaluated and discussed with  Dr. Lacy.    Palmer Ferrera MD  Orthopaedic Surgery PGY5  Pager: [phone]        I saw the patient with the resident or fellow.  I agree with the resident or fellow note and plan of care.      Jabier Lacy MD

## 2022-09-19 NOTE — PROGRESS NOTES
I saw the patient with the resident or fellow.  I agree with the resident or fellow note and plan of care.      Jabier Lacy MD

## 2022-09-19 NOTE — NURSING NOTE
Reason For Visit:   Chief Complaint   Patient presents with     Consult     Bilateral chronic shoulder pain.          PCP: Prashant Gilbert      ?  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  Work status?  Full time.  Date of injury: None  Date of surgery: None    Smoker: No      Right  hand dominant    SANE score  Affected shoulder: Bilateral   Right shoulder SANE: 80-90  Left shoulder SANE: 80-90    There were no vitals taken for this visit.      Pain Assessment  Patient Currently in Pain: Minor Robison LPN

## 2022-09-19 NOTE — PROGRESS NOTES
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  Patient states he has had chronic bilateral shoulder pain for several years.  Within the last year he has had episodic flares of bilateral shoulder pain requiring corticosteroid injections.  He has had good relief of his shoulder pain with injection use.  He denies any prior history of shoulder injuries or surgery.  He notes his pain is exacerbated with excessive use or overhead activities.  He does have some night pain.  He is also currently using diclofenac anti-inflammatory medications.  He is currently modifying his activities and no longer participates in horse therapy fitness classes due to concern for exacerbating his shoulder symptoms.  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  He denies any neck pain or numbness or tingling.    Past medical history:  No past medical history on file.    Past Surgical History:   Procedure Laterality Date     COLON SURGERY         Current Outpatient Medications   Medication Sig Dispense Refill     buPROPion (WELLBUTRIN XL) 300 MG 24 hr tablet Take 300 mg by mouth       diclofenac (VOLTAREN) 75 MG EC tablet Take 1 tablet (75 mg) by mouth 2 times daily 62 tablet 1     ibuprofen (ADVIL/MOTRIN) 200 MG tablet Take 400 mg by mouth       losartan (COZAAR) 50 MG tablet Take 75 mg by mouth       propranolol (INDERAL) 20 MG tablet Take 20 mg by mouth            Allergies   Allergen Reactions     Sulfa Drugs Rash       SOCIAL HISTORY:    Social History     Socioeconomic History     Marital status: Single     Spouse name: Not on file     Number of children: Not on file     Years of education: Not on file     Highest education level: Not on file   Occupational History     Not on file   Tobacco Use     Smoking status: Never Smoker     Smokeless tobacco: Never Used    Substance and Sexual Activity     Alcohol use: Yes     Alcohol/week: 14.0 standard drinks     Types: 14 Glasses of wine per week     Drug use: Not on file     Sexual activity: Not on file   Other Topics Concern     Not on file   Social History Narrative     Not on file     Social Determinants of Health     Financial Resource Strain: Not on file   Food Insecurity: Not on file   Transportation Needs: Not on file   Physical Activity: Not on file   Stress: Not on file   Social Connections: Not on file   Intimate Partner Violence: Not on file   Housing Stability: Not on file   Occupation: Violinist "[employer]  Enjoys [employer]    FAMILY HISTORY: Reviewed in EMR      REVIEW OF SYSTEMS: Positive for that noted in past medical history and history of present illness and otherwise reviewed in EMR     PHYSICAL EXAM:    Adult male in no acute distress. Articulates and communicates with normal affect.  Respirations even and unlabored  Focused bilateral upper extremity exam:   Skin intact, no erythema or prior incisions.  No tenderness palpation of AC joint or bicipital groove bilaterally.    Range of motion with 180 degrees forward flexion bilaterally, 30 degrees external rotation of the right and 50 degrees in the left, internal rotation L3 bilaterally.  Rotator cuff strength testing noted for 5/5 strength bilaterally with testing of the supraspinatus, infraspinatus and subscapularis.  Sensation intact to light touch on the lateral aspect with deltoid, median, radial, ulnar nerve distributions bilaterally.  Fires deltoid bilaterally.  Fires EPL FPL FDP and interossei bilaterally.  Hands warm well perfused.    IMAGING:  X-rays of the right shoulder 2/2/2022 were independently reviewed and demonstrate moderate glenohumeral arthritic changes with developing inferior proximal humeral osteophyte and narrowing of the glenohumeral joint space.  Axillary view does not demonstrate any posterior glenoid wear or humeral  subluxation. There is no proximal humeral migration.  No fracture or dislocation.      X-rays of the left shoulder 2022 independently reviewed and demonstrate moderate glenohumeral arthritic changes more advanced than the contralateral right shoulder.  There is a large inferior proximal humeral osteophyte and narrowing of the glenohumeral joint space.  There is no evidence of posterior glenoid wear or humeral head subluxation.  There is no proximal humeral migration. No fracture or dislocation.    ASSESSMENT:    51 yo M with the followin. 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  Should he experience a flareup of shoulder pain it would be reasonable for him to receive repeat corticosteroid injections.  We instructed him to call the clinic should this occur and we will schedule him to receive either right or left ultrasound-guided glenohumeral corticosteroid injection.  We recommend following up for repeat x-rays of bilateral shoulders in 1 year to continue to monitor his glenohumeral arthritis and evaluate for any progression of posterior glenoid wear.  Should his symptoms significantly worsen and limit his quality of life or ability to perform his job, or he develops significant glenoid bone loss, the neck step in treatment would then be anamtomic total shoulder arthroplasty.  We discussed the expectations and function after total shoulder arthroplasty and all questions were answered today's visit.    Patient seen, evaluated and discussed with Dr. Lacy.    Palmer Ferrera MD  Orthopaedic Surgery PGY5  Pager: [phone]

## 2022-10-05 ENCOUNTER — OFFICE VISIT (OUTPATIENT)
Dept: ORTHOPEDICS | Facility: CLINIC | Age: 52
End: 2022-10-05
Payer: COMMERCIAL

## 2022-10-05 VITALS — WEIGHT: 194 LBS | HEIGHT: 70 IN | BODY MASS INDEX: 27.77 KG/M2

## 2022-10-05 DIAGNOSIS — M25.562 CHRONIC PAIN OF LEFT KNEE: ICD-10-CM

## 2022-10-05 DIAGNOSIS — G89.29 CHRONIC RIGHT SHOULDER PAIN: Primary | ICD-10-CM

## 2022-10-05 DIAGNOSIS — M25.511 CHRONIC RIGHT SHOULDER PAIN: Primary | ICD-10-CM

## 2022-10-05 DIAGNOSIS — G89.29 CHRONIC PAIN OF LEFT KNEE: ICD-10-CM

## 2022-10-05 PROCEDURE — 99213 OFFICE O/P EST LOW 20 MIN: CPT | Performed by: FAMILY MEDICINE

## 2022-10-05 NOTE — PROGRESS NOTES
[facility]  Sports Medicine Clinic  Cl[facility]ics and [facility]         SUBJECTIVE       Yoni Hernandez is a 52 year old male presenting to clinic today with:    F/u R shoulder:  Previous CSI in his subacromial space by his PCP in Jan 2022 that helped a lot.  His R shoulder flared up for unknown reasons.  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  No trauma.  No change in activities.  It isn't as bad now but still hurts.  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    - CSI subacromial space 6/20/2022 helped a lot as well.   -Saw Dr. Lacy and he recommended continued treatment with GH MSK US Guided CSI per the typcial protocol.  The patient hasn't had that type of injection yet.  His pain a 2/10 and he is doing well with playing violin.  No problems sleeping.  Taking Voltaren 75mg bid most for his knee and that is helping both things.  No side effects from the Voltaren orally and he has been on it for about one month.        -L knee was swollen and painful but is doing much better now.  Previous MRI:   Impression:  1. Small partial tear of the posterior root ligament of the medial  meniscus with associated meniscal flap.  2. Sprain medial supporting structures including high-grade  sprain/possible tearing meniscofemoral ligament proximally.  3. Sprain anterolateral ligament.  4. Query patellar tendon-lateral femoral condyle friction syndrome.  Normal TT:TG, Insall-Salvati ratio.  5. Grade IV chondromalacia patellofemoral compartment and grade II/III  chondromalacia medial compartment with possible delamination  component.  6. Large popliteal cyst, leaking caudally.  7. Moderate joint effusion.    Saw Dr. Del Angel and he didn't think surgery was indicated but treated him with Knee jt csi and that had helped.           PMH, Medications and Allergies were reviewed and updated as needed.    ROS:  As noted above otherwise negative.    Patient Active Problem List   Diagnosis      Tendinopathy of left rotator cuff     Tendinopathy of left biceps tendon     Chronic left shoulder pain     Chronic right shoulder pain       Current Outpatient Medications   Medication Sig Dispense Refill     buPROPion (WELLBUTRIN XL) 300 MG 24 hr tablet Take 300 mg by mouth       diclofenac (VOLTAREN) 75 MG EC tablet Take 1 tablet (75 mg) by mouth 2 times daily 62 tablet 1     ibuprofen (ADVIL/MOTRIN) 200 MG tablet Take 400 mg by mouth       losartan (COZAAR) 50 MG tablet Take 75 mg by mouth       propranolol (INDERAL) 20 MG tablet Take 20 mg by mouth              OBJECTIVE:       Vitals: There were no vitals filed for this visit.  BMI: There is no height or weight on file to calculate BMI.    Gen:  Well nourished and in no acute distress  HEENT: Extraocular movement intact  Neck: Supple  Pulm:  Breathing Comfortably. No increased respiratory effort.  Psych: Euthymic. Appropriately answers questions    R shoulder:    FROM with pain at 150-180 with adb and flexion  +Alan's for pain and weakness, IR resisted strength testing 4/5 compared to L 5/5  Nttp over the AC joint or the SCJ  +Hawkin's and Neer's      Narrative & Impression   MR left knee without contrast 6/9/2022 8:01 AM     Techniques: Multiplanar multisequence imaging of the left knee was  obtained without administration of intra-articular or intravenous  contrast using routing protocol.     History: Knee trauma, meniscal/ligament injury suspected, xray done  (Age >= 1y); Injury of left knee, initial encounter     Additional History from EMR: Tweaked knee several weeks ago during a  run. Clicking, popping, snapping sensations especially during stairs.     Comparison: Radiograph 5/26/2022     Findings:     MENISCI:  Medial meniscus: Approximately 5 mm hypointense structure adjacent to  the posterior horn root ligament of the medial meniscus, appearing to  continue with posterior root ligament, likely representing inverted  meniscal flap from the partial  posterior root ligament tear. Otherwise  intact.  Lateral meniscus: Prominent anterior and medial extension of lateral  meniscus posterior root, presumably anatomic variant. Otherwise,  intact.     LIGAMENTS  Cruciate ligaments: Intact.  Medial supporting structures: Periligamentous edema around the tibial  collateral ligament and posterior oblique ligament, consistent with  low-grade sprain. High-grade sprain/possible tearing meniscofemoral  ligament proximally. Moderate grade sprain meniscotibial ligament.  Small tibial collateral ligament bursal fluid.  Lateral supporting structures: Intact. Sprain anterolateral ligament.     EXTENSOR MECHANISM  Intact. Focal edema at the superolateral aspect of Hoffa's fat pad.  Insall-Salvati ratio measures 1.32, within normal limit. Tibial  tuberosity to trochlear groove distance measures 10 mm, within normal  limit. No patellar tilt or lateral subluxation.     FLUID  Moderate joint effusion. Large popliteal cyst, leaking caudally. Small  tibial collateral ligament bursal fluid.     OSSEOUS and ARTICULAR STRUCTURES  Bones: No fracture, contusion, or osseous lesion is seen.     Patellofemoral compartment: Multiple focal chondral fissuring of the  patella including full-thickness fissure with subchondral edema-like  marrow signal intensity at the patellar median ridge.     Medial compartment: Low to moderate grade chondral loss of the central  weightbearing surface of medial femoral condyle with possible  delamination component. No subchondral osseous abnormality.     Lateral compartment: No high-grade hyaline cartilage disease.                                                      Impression:  1. Small partial tear of the posterior root ligament of the medial  meniscus with associated meniscal flap.  2. Sprain medial supporting structures including high-grade  sprain/possible tearing meniscofemoral ligament proximally.  3. Sprain anterolateral ligament.  4. Query patellar  tendon-lateral femoral condyle friction syndrome.  Normal TT:TG, Insall-Salvati ratio.  5. Grade IV chondromalacia patellofemoral compartment and grade II/III  chondromalacia medial compartment with possible delamination  component.  6. Large popliteal cyst, leaking caudally.  7. Moderate joint effusion.     I have personally reviewed the examination and initial interpretation  and I agree with the findings.     JEEVAN SHAH         SYSTEM ID:  [id number]              ASSESSMENT and PLAN:     Diagnoses and all orders for this visit:    Chronic right shoulder pain    R AC Joint OA and R GH Joint OA  -CSI to the R subacromial space Jan 2022 and 6/20/22 with a good response.   -Could consider a MSK US guided GH CSI when he needs another injection. Currently his pain is mild and I don't feel that an injection in indicated.  He is comfortable with this decision.    -Continue diclofenac 75 mg bid but try to taper to 1 per day and then prn.  If he needs to use it the majority of the time BID, then we would want to monitor his labs in a few months.    -Continue his rehab exercises.      R Knee pain:   Small partial tear of the posterior root ligament of the medial  meniscus with associated meniscal flap.  2. Sprain medial supporting structures including high-grade  sprain/possible tearing meniscofemoral ligament proximally.  3. Sprain anterolateral ligament.  4. Query patellar tendon-lateral femoral condyle friction syndrome.  Normal TT:TG, Insall-Salvati ratio.  5. Grade IV chondromalacia patellofemoral compartment and grade II/III  chondromalacia medial compartment with possible delamination  Component.    PLAN:  Doing well now with his knee.  Use diclofenac as noted above.   His knee is quiet today without effusion or pain.        RTC PRN      Options for treatment and/or follow-up care were reviewed with the patient was actively involved in the decision making process. Patient verbalized understanding and was in  agreement with the plan.    Miranda Huston MD, CAQ, FACSM, CCD  [facility]  Sports Medicine and Bone Health  Team Physician;  Athletics

## 2022-10-05 NOTE — LETTER
10/5/2022      RE: Yoni Hernandez  [address]  [place] [address]     Dear Colleague,    Thank you for referring your patient, Yoni Hernandez, to the [facility] SPORTS MEDICINE CLINIC [place]. Please see a copy of my visit note below.      [facility]  Sports Medicine Clinic  Clinics and Surgery Center         SUBJECTIVE       Yoni Hernandez is a 52 year old male presenting to clinic today with:    F/u R shoulder:  Previous CSI in his subacromial space by his PCP in Jan 2022 that helped a lot.  His R shoulder flared up for unknown reasons.  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  No trauma.  No change in activities.  It isn't as bad now but still hurts.  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    - CSI subacromial space 6/20/2022 helped a lot as well.   -Saw Dr. Lacy and he recommended continued treatment with GH MSK US Guided CSI per the typcial protocol.  The patient hasn't had that type of injection yet.  His pain a 2/10 and he is doing well with playing violin.  No problems sleeping.  Taking Voltaren 75mg bid most for his knee and that is helping both things.  No side effects from the Voltaren orally and he has been on it for about one month.        -L knee was swollen and painful but is doing much better now.  Previous MRI:   Impression:  1. Small partial tear of the posterior root ligament of the medial  meniscus with associated meniscal flap.  2. Sprain medial supporting structures including high-grade  sprain/possible tearing meniscofemoral ligament proximally.  3. Sprain anterolateral ligament.  4. Query patellar tendon-lateral femoral condyle friction syndrome.  Normal TT:TG, Insall-Salvati ratio.  5. Grade IV chondromalacia patellofemoral compartment and grade II/III  chondromalacia medial compartment with possible delamination  component.  6. Large popliteal cyst, leaking caudally.  7. Moderate joint effusion.    Saw Dr. Del Angel and he  didn't think surgery was indicated but treated him with Knee jt csi and that had helped.           PMH, Medications and Allergies were reviewed and updated as needed.    ROS:  As noted above otherwise negative.    Patient Active Problem List   Diagnosis     Tendinopathy of left rotator cuff     Tendinopathy of left biceps tendon     Chronic left shoulder pain     Chronic right shoulder pain       Current Outpatient Medications   Medication Sig Dispense Refill     buPROPion (WELLBUTRIN XL) 300 MG 24 hr tablet Take 300 mg by mouth       diclofenac (VOLTAREN) 75 MG EC tablet Take 1 tablet (75 mg) by mouth 2 times daily 62 tablet 1     ibuprofen (ADVIL/MOTRIN) 200 MG tablet Take 400 mg by mouth       losartan (COZAAR) 50 MG tablet Take 75 mg by mouth       propranolol (INDERAL) 20 MG tablet Take 20 mg by mouth              OBJECTIVE:       Vitals: There were no vitals filed for this visit.  BMI: There is no height or weight on file to calculate BMI.    Gen:  Well nourished and in no acute distress  HEENT: Extraocular movement intact  Neck: Supple  Pulm:  Breathing Comfortably. No increased respiratory effort.  Psych: Euthymic. Appropriately answers questions    R shoulder:    FROM with pain at 150-180 with adb and flexion  +Alan's for pain and weakness, IR resisted strength testing 4/5 compared to L 5/5  Nttp over the AC joint or the SCJ  +Hawkin's and Neer's      Narrative & Impression   MR left knee without contrast 6/9/2022 8:01 AM     Techniques: Multiplanar multisequence imaging of the left knee was  obtained without administration of intra-articular or intravenous  contrast using routing protocol.     History: Knee trauma, meniscal/ligament injury suspected, xray done  (Age >= 1y); Injury of left knee, initial encounter     Additional History from EMR: Tweaked knee several weeks ago during a  run. Clicking, popping, snapping sensations especially during stairs.     Comparison: Radiograph  5/26/2022     Findings:     MENISCI:  Medial meniscus: Approximately 5 mm hypointense structure adjacent to  the posterior horn root ligament of the medial meniscus, appearing to  continue with posterior root ligament, likely representing inverted  meniscal flap from the partial posterior root ligament tear. Otherwise  intact.  Lateral meniscus: Prominent anterior and medial extension of lateral  meniscus posterior root, presumably anatomic variant. Otherwise,  intact.     LIGAMENTS  Cruciate ligaments: Intact.  Medial supporting structures: Periligamentous edema around the tibial  collateral ligament and posterior oblique ligament, consistent with  low-grade sprain. High-grade sprain/possible tearing meniscofemoral  ligament proximally. Moderate grade sprain meniscotibial ligament.  Small tibial collateral ligament bursal fluid.  Lateral supporting structures: Intact. Sprain anterolateral ligament.     EXTENSOR MECHANISM  Intact. Focal edema at the superolateral aspect of Hoffa's fat pad.  Insall-Salvati ratio measures 1.32, within normal limit. Tibial  tuberosity to trochlear groove distance measures 10 mm, within normal  limit. No patellar tilt or lateral subluxation.     FLUID  Moderate joint effusion. Large popliteal cyst, leaking caudally. Small  tibial collateral ligament bursal fluid.     OSSEOUS and ARTICULAR STRUCTURES  Bones: No fracture, contusion, or osseous lesion is seen.     Patellofemoral compartment: Multiple focal chondral fissuring of the  patella including full-thickness fissure with subchondral edema-like  marrow signal intensity at the patellar median ridge.     Medial compartment: Low to moderate grade chondral loss of the central  weightbearing surface of medial femoral condyle with possible  delamination component. No subchondral osseous abnormality.     Lateral compartment: No high-grade hyaline cartilage disease.                                                      Impression:  1. Small  partial tear of the posterior root ligament of the medial  meniscus with associated meniscal flap.  2. Sprain medial supporting structures including high-grade  sprain/possible tearing meniscofemoral ligament proximally.  3. Sprain anterolateral ligament.  4. Query patellar tendon-lateral femoral condyle friction syndrome.  Normal TT:TG, Insall-Salvati ratio.  5. Grade IV chondromalacia patellofemoral compartment and grade II/III  chondromalacia medial compartment with possible delamination  component.  6. Large popliteal cyst, leaking caudally.  7. Moderate joint effusion.     I have personally reviewed the examination and initial interpretation  and I agree with the findings.     T[place]on         SYSTEM ID:  [id number]              ASSESSMENT and PLAN:     Diagnoses and all orders for this visit:    Chronic right shoulder pain    R AC Joint OA and R GH Joint OA  -CSI to the R subacromial space Jan 2022 and 6/20/22 with a good response.   -Could consider a MSK US guided GH CSI when he needs another injection. Currently his pain is mild and I don't feel that an injection in indicated.  He is comfortable with this decision.    -Continue diclofenac 75 mg bid but try to taper to 1 per day and then prn.  If he needs to use it the majority of the time BID, then we would want to monitor his labs in a few months.    -Continue his rehab exercises.      R Knee pain:   Small partial tear of the posterior root ligament of the medial  meniscus with associated meniscal flap.  2. Sprain medial supporting structures including high-grade  sprain/possible tearing meniscofemoral ligament proximally.  3. Sprain anterolateral ligament.  4. Query patellar tendon-lateral femoral condyle friction syndrome.  Normal TT:TG, Insall-Salvati ratio.  5. Grade IV chondromalacia patellofemoral compartment and grade II/III  chondromalacia medial compartment with possible delamination  Component.    PLAN:  Doing well now with his knee.  Use  diclofenac as noted above.   His knee is quiet today without effusion or pain.        RTC PRN      Options for treatment and/or follow-up care were reviewed with the patient was actively involved in the decision making process. Patient verbalized understanding and was in agreement with the plan.    Miranda Huston MD, CAQ, FACSM, CCD  [facility]  Sports Medicine and Bone Health  Team Physician;  Athletics

## 2022-10-17 DIAGNOSIS — S89.92XA INJURY OF LEFT KNEE, INITIAL ENCOUNTER: ICD-10-CM

## 2022-10-21 NOTE — TELEPHONE ENCOUNTER
diclofenac (VOLTAREN) 75 MG EC tablet      Last Written Prescription Date:  7/29/22  Last Fill Quantity: 62,   # refills: 1  Last Office Visit : 10/5/22  Future Office visit:  None scheduled    Routing refill request to provider for review/approval because:  Blood pressure not documented  Most recent lab care everywhere 5/24/21 BMET  No AST/ALT, CBC

## 2022-10-23 RX ORDER — DICLOFENAC SODIUM 75 MG/1
75 TABLET, DELAYED RELEASE ORAL 2 TIMES DAILY PRN
Qty: 62 TABLET | Refills: 1 | Status: SHIPPED | OUTPATIENT
Start: 2022-10-23 | End: 2023-02-23

## 2022-10-26 ENCOUNTER — OFFICE VISIT (OUTPATIENT)
Dept: ORTHOPEDICS | Facility: CLINIC | Age: 52
End: 2022-10-26
Payer: COMMERCIAL

## 2022-10-26 VITALS — WEIGHT: 194 LBS | HEIGHT: 70 IN | BODY MASS INDEX: 27.77 KG/M2

## 2022-10-26 DIAGNOSIS — G89.29 CHRONIC PAIN OF LEFT KNEE: Primary | ICD-10-CM

## 2022-10-26 DIAGNOSIS — M25.562 CHRONIC PAIN OF LEFT KNEE: Primary | ICD-10-CM

## 2022-10-26 PROCEDURE — 20610 DRAIN/INJ JOINT/BURSA W/O US: CPT | Mod: LT | Performed by: ORTHOPAEDIC SURGERY

## 2022-10-26 RX ADMIN — TRIAMCINOLONE ACETONIDE 40 MG: 40 INJECTION, SUSPENSION INTRA-ARTICULAR; INTRAMUSCULAR at 08:31

## 2022-10-26 RX ADMIN — LIDOCAINE HYDROCHLORIDE 8 ML: 5 INJECTION, SOLUTION INFILTRATION; INTRAVENOUS at 08:31

## 2022-10-26 NOTE — LETTER
10/26/2022         RE: Yoni Hernandez  [address]  [place] [address]        Dear Colleague,    Thank you for referring your patient, Yoni Hernandez, to the [facility] ORTHOPEDIC [facility] [place]. Please see a copy of my visit note below.    Yoni Hernandez is a very pleasant 52-year-old male.  Returns to my clinic today for interval follow-up.  He would like an injection about his left knee.  I performed one in June.  Good relief.  He would like another 1.    He states that he tolerated the last injection well.    Examination today shows overall good motion.  Ligaments stable.  Neurovascular intact    Clinical assessment: Medial compartment and patellofemoral chondrosis responded well to corticosteroid injection management    Plan: I offered him a repeat injection.    The patient accepted.    After informed consent was obtained under sterile technique 40 mg of Kenalog was injected without complication.  Patient tolerated the injection well.  Sterile dressings were applied.    He can follow-up with me on an as-needed basis going forward.  In general he is allowed to get 2 to 3 injections/year.  No lifetime maximum.    Large Joint Injection: L knee joint    Date/Time: 10/26/2022 8:31 AM  Performed by: Joseluis Del Angel MD  Authorized by: Joseluis Del Angel MD     Indications:  Pain  Needle Size:  22 G  Guidance: landmark guided    Approach:  Anterolateral  Location:  Knee      Medications:  40 mg triamcinolone 40 MG/ML; 8 mL lidocaine (PF) 0.5 %  Outcome:  Tolerated well, no immediate complications  Procedure discussed: discussed risks, benefits, and alternatives    Consent Given by:  Patient  Timeout: timeout called immediately prior to procedure    Prep: patient was prepped and draped in usual sterile fashion          Again, thank you for allowing me to participate in the care of your patient.        Sincerely,        Joseluis Del Angel MD

## 2022-10-26 NOTE — NURSING NOTE
[facility] SPORTS [address] [address]  Dept: [phone]  ______________________________________________________________________________    Patient: Yoni Hernandez   : 1970   MRN: [id number]   2022    INVASIVE PROCEDURE SAFETY CHECKLIST    Date: 10/26/22   Procedure: Left knee kenalog injection  Patient Name: Yoni Hernandez  MRN: [id number]  YOB: 1970    Action: Complete sections as appropriate. Any discrepancy results in a HARD COPY until resolved.     PRE PROCEDURE:  Patient ID verified with 2 identifiers (name and  or MRN): Yes  Procedure and site verified with patient/designee (when able): Yes  Accurate consent documentation in medical record: Yes  H&P (or appropriate assessment) documented in medical record: Yes  H&P must be up to 20 days prior to procedure and updates within 24 hours of procedure as applicable: NA  Relevant diagnostic and radiology test results appropriately labeled and displayed as applicable: Yes  Procedure site(s) marked with provider initials: NA    TIMEOUT:  Time-Out performed immediately prior to starting procedure, including verbal and active participation of all team members addressing the following:Yes  * Correct patient identify  * Confirmed that the correct side and site are marked  * An accurate procedure consent form  * Agreement on the procedure to be done  * Correct patient position  * Relevant images and results are properly labeled and appropriately displayed  * The need to administer antibiotics or fluids for irrigation purposes during the procedure as applicable   * Safety precautions based on patient history or medication use    DURING PROCEDURE: Verification of correct person, site, and procedures any time the responsibility for care of the patient is transferred to another member of the care team.       Prior to injection, verified patient identity using patient's name and date of birth.  Due  to injection administration, patient instructed to remain in clinic for 15 minutes  afterwards, and to report any adverse reaction to me immediately.    Joint injection was performed.      Drug Amount Wasted:  Yes: 42 mg/ml   Vial/Syringe: Single dose vial  Expiration Date:  12/2025      Kristina Farooq, NATALI  October 26, 2022

## 2022-10-26 NOTE — PROGRESS NOTES
Yoni Hernandez is a very pleasant 52-year-old male.  Returns to my clinic today for interval follow-up.  He would like an injection about his left knee.  I performed one in June.  Good relief.  He would like another 1.    He states that he tolerated the last injection well.    Examination today shows overall good motion.  Ligaments stable.  Neurovascular intact    Clinical assessment: Medial compartment and patellofemoral chondrosis responded well to corticosteroid injection management    Plan: I offered him a repeat injection.    The patient accepted.    After informed consent was obtained under sterile technique 40 mg of Kenalog was injected without complication.  Patient tolerated the injection well.  Sterile dressings were applied.    He can follow-up with me on an as-needed basis going forward.  In general he is allowed to get 2 to 3 injections/year.  No lifetime maximum.    Large Joint Injection: L knee joint    Date/Time: 10/26/2022 8:31 AM  Performed by: Joseluis Del Angel MD  Authorized by: Joseluis Del Angel MD     Indications:  Pain  Needle Size:  22 G  Guidance: landmark guided    Approach:  Anterolateral  Location:  Knee      Medications:  40 mg triamcinolone 40 MG/ML; 8 mL lidocaine (PF) 0.5 %  Outcome:  Tolerated well, no immediate complications  Procedure discussed: discussed risks, benefits, and alternatives    Consent Given by:  Patient  Timeout: timeout called immediately prior to procedure    Prep: patient was prepped and draped in usual sterile fashion

## 2022-10-31 ENCOUNTER — MYC MEDICAL ADVICE (OUTPATIENT)
Dept: ORTHOPEDICS | Facility: CLINIC | Age: 52
End: 2022-10-31

## 2022-10-31 NOTE — TELEPHONE ENCOUNTER
I returned the patient's phone call and offered a spot on Dr. Huston's procedure clinic on 11/4/22. Patient chose to come in at 2PM. I confirmed the time and location of upcoming appointment. All questions were answered at this time. Rachael Zee ATC on 10/31/2022 at 2:00 PM

## 2022-11-01 RX ORDER — TRIAMCINOLONE ACETONIDE 40 MG/ML
40 INJECTION, SUSPENSION INTRA-ARTICULAR; INTRAMUSCULAR
Status: SHIPPED | OUTPATIENT
Start: 2022-10-26

## 2022-11-01 RX ORDER — LIDOCAINE HYDROCHLORIDE 5 MG/ML
8 INJECTION, SOLUTION INFILTRATION; INTRAVENOUS
Status: SHIPPED | OUTPATIENT
Start: 2022-10-26

## 2022-11-04 ENCOUNTER — OFFICE VISIT (OUTPATIENT)
Dept: ORTHOPEDICS | Facility: CLINIC | Age: 52
End: 2022-11-04
Payer: COMMERCIAL

## 2022-11-04 VITALS — HEIGHT: 70 IN | WEIGHT: 194 LBS | BODY MASS INDEX: 27.77 KG/M2

## 2022-11-04 DIAGNOSIS — G89.29 CHRONIC LEFT SHOULDER PAIN: Primary | ICD-10-CM

## 2022-11-04 DIAGNOSIS — M25.512 CHRONIC LEFT SHOULDER PAIN: Primary | ICD-10-CM

## 2022-11-04 PROCEDURE — 99207 PR DROP WITH A PROCEDURE: CPT | Performed by: FAMILY MEDICINE

## 2022-11-04 PROCEDURE — 20610 DRAIN/INJ JOINT/BURSA W/O US: CPT | Mod: RT | Performed by: FAMILY MEDICINE

## 2022-11-04 RX ADMIN — TRIAMCINOLONE ACETONIDE 40 MG: 40 INJECTION, SUSPENSION INTRA-ARTICULAR; INTRAMUSCULAR at 14:38

## 2022-11-04 RX ADMIN — LIDOCAINE HYDROCHLORIDE 9 ML: 10 INJECTION, SOLUTION EPIDURAL; INFILTRATION; INTRACAUDAL; PERINEURAL at 14:38

## 2022-11-04 NOTE — PROGRESS NOTES
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  Pain is posterior upper arm and long the deltoid.  Symptoms with overhead motions and reaching across.  Hoping for an injection.  He hurts so much that he can hardly play his violin.       O:  NAD  Ht 1.778 m (5' 10\")   Wt 88 kg (194 lb)   BMI 27.84 kg/m      R shoulder:  FROM but painful and slow from approx 150 degrees to 180 degrees with abduction and flexion. +Daniel and Neer's signs.  Neg crossover.  Minimal ttp over the R AC joint  Pain with strength testing with resisted Alan's testing, resisted IR and ER.       R shoulder subacromial space corticosteroid injection  After risks, benefits and complications of steroid injection were discussed with the patient he elected to proceed. Formal written consent was obtained.  Using sterile technique, the area was first prepped with chloraprep.  Topical ethyl chloride was used as local.  A 22 gauge, 1 1/2 inch needle was used to inject 40 mg kenalong and 9cc of 1% lidocaine  into the subacromial space using a posterolateral approach on the right.  Pt.  tolerated the procedure well without complications.  Some improvement in rom and pain was noted immediately following the injection. p injection instructions given.          A:  R shoulder rotator cuff tendinitis/bursitis  R AC joint OA  R shoulder GH OA    2. CSI to the R subacromial space  -See procedure note.    -Monitor symptoms and if not improving will need a shoulder MRI.        Miranda Huston MD, CAQ, FACSM, [facility]  Sports Medicine and Bone Health  Team Physician;  Athletics       "

## 2022-11-04 NOTE — NURSING NOTE
[address] [address]  Dept: [phone]  ______________________________________________________________________________    Patient: Yoni Hernandez   : 1970   MRN: [id number]   2022    INVASIVE PROCEDURE SAFETY CHECKLIST    Date: 2022   Procedure:Right shoulder subacromial bursa cortisone injection  Patient Name: Yoni Hernandez  MRN: [id number]  YOB: 1970    Action: Complete sections as appropriate. Any discrepancy results in a HARD COPY until resolved.     PRE PROCEDURE:  Patient ID verified with 2 identifiers (name and  or MRN): Yes  Procedure and site verified with patient/designee (when able): Yes  Accurate consent documentation in medical record: Yes  H&P (or appropriate assessment) documented in medical record: Yes  H&P must be up to 20 days prior to procedure and updates within 24 hours of procedure as applicable: NA  Relevant diagnostic and radiology test results appropriately labeled and displayed as applicable: Yes  Procedure site(s) marked with provider initials: NA    TIMEOUT:  Time-Out performed immediately prior to starting procedure, including verbal and active participation of all team members addressing the following:Yes  * Correct patient identify  * Confirmed that the correct side and site are marked  * An accurate procedure consent form  * Agreement on the procedure to be done  * Correct patient position  * Relevant images and results are properly labeled and appropriately displayed  * The need to administer antibiotics or fluids for irrigation purposes during the procedure as applicable   * Safety precautions based on patient history or medication use    DURING PROCEDURE: Verification of correct person, site, and procedures any time the responsibility for care of the patient is transferred to another member of the care team.       Prior to injection, verified patient identity using  patient's name and date of birth.  Due to injection administration, patient instructed to remain in clinic for 15 minutes  afterwards, and to report any adverse reaction to me immediately.    Bursa injection was performed.      Drug Amount Wasted:  Yes: 1 mg/ml   Vial/Syringe: Single dose vial  Expiration Date:  08/01/2026      Rachael Zee, ATC  November 4, 2022

## 2022-11-04 NOTE — PROGRESS NOTES
Large Joint Injection: R subacromial bursa    Date/Time: 11/4/2022 2:38 PM  Performed by: Miranda Huston MD  Authorized by: Miranda Huston MD     Indications:  Pain  Needle Size:  22 G  Guidance: landmark guided    Approach:  Posterior  Location:  Shoulder      Site:  R subacromial bursa  Medications:  40 mg triamcinolone 40 MG/ML; 9 mL lidocaine (PF) 1 %  Outcome:  Tolerated well, no immediate complications  Procedure discussed: discussed risks, benefits, and alternatives    Consent Given by:  Patient  Timeout: timeout called immediately prior to procedure    Prep: patient was prepped and draped in usual sterile fashion

## 2022-11-04 NOTE — LETTER
"  11/4/2022      RE: Yoni Hernandez  [address]  [place] [address]     Dear Colleague,    Thank you for referring your patient, Yoni Hernandez, to the [facility] SPORTS MEDICINE CLINIC [place]. Please see a copy of my visit note below.      Large Joint Injection: R subacromial bursa    Date/Time: 11/4/2022 2:38 PM  Performed by: Miranda Huston MD  Authorized by: Miranda Huston MD     Indications:  Pain  Needle Size:  22 G  Guidance: landmark guided    Approach:  Posterior  Location:  Shoulder      Site:  R subacromial bursa  Medications:  40 mg triamcinolone 40 MG/ML; 9 mL lidocaine (PF) 1 %  Outcome:  Tolerated well, no immediate complications  Procedure discussed: discussed risks, benefits, and alternatives    Consent Given by:  Patient  Timeout: timeout called immediately prior to procedure    Prep: patient was prepped and draped in usual sterile fashion            S:  51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  Pain is posterior upper arm and long the deltoid.  Symptoms with overhead motions and reaching across.  Hoping for an injection.  He hurts so much that he can hardly play his violin.       O:  NAD  Ht 1.778 m (5' 10\")   Wt 88 kg (194 lb)   BMI 27.84 kg/m      R shoulder:  FROM but painful and slow from approx 150 degrees to 180 degrees with abduction and flexion. +Daniel and Neer's signs.  Neg crossover.  Minimal ttp over the R AC joint  Pain with strength testing with resisted Alan's testing, resisted IR and ER.       R shoulder subacromial space corticosteroid injection  After risks, benefits and complications of steroid injection were discussed with the patient he elected to proceed. Formal written consent was obtained.  Using sterile technique, the area was first prepped with chloraprep.  Topical ethyl chloride was used as local.  A 22 gauge, 1 1/2 inch needle was used to inject 40 mg kenalong and 9cc of 1% lidocaine  into the " subacromial space using a posterolateral approach on the right.  Pt.  tolerated the procedure well without complications.  Some improvement in rom and pain was noted immediately following the injection. p injection instructions given.          A:  R shoulder rotator cuff tendinitis/bursitis  R AC joint OA  R shoulder GH OA    2. CSI to the R subacromial space  -See procedure note.    -Monitor symptoms and if not improving will need a shoulder MRI.        Miranda Huston MD, CAQ, FACSM, FAMSSM  [facility]  Sports Medicine and Bone Health  Team Physician;  Athletics

## 2022-11-13 RX ORDER — LIDOCAINE HYDROCHLORIDE 10 MG/ML
9 INJECTION, SOLUTION EPIDURAL; INFILTRATION; INTRACAUDAL; PERINEURAL
Status: SHIPPED | OUTPATIENT
Start: 2022-11-04

## 2022-11-13 RX ORDER — TRIAMCINOLONE ACETONIDE 40 MG/ML
40 INJECTION, SUSPENSION INTRA-ARTICULAR; INTRAMUSCULAR
Status: SHIPPED | OUTPATIENT
Start: 2022-11-04

## 2022-11-26 ENCOUNTER — MEDICAL CORRESPONDENCE (OUTPATIENT)
Dept: ORTHOPEDICS | Facility: CLINIC | Age: 52
End: 2022-11-26

## 2022-11-29 DIAGNOSIS — S89.92XA INJURY OF LEFT KNEE, INITIAL ENCOUNTER: ICD-10-CM

## 2022-11-29 RX ORDER — DICLOFENAC SODIUM 75 MG/1
75 TABLET, DELAYED RELEASE ORAL 2 TIMES DAILY PRN
Qty: 62 TABLET | Refills: 1 | Status: CANCELLED | OUTPATIENT
Start: 2022-11-29

## 2022-11-30 NOTE — TELEPHONE ENCOUNTER
Dr. Huston signed prescription refill and was faxed to patient's pharmacy [facility] at [address]. No answer, left detailed message of this information. Rachael Zee, ATC on 11/30/2022 at 3:20 PM

## 2022-12-12 ENCOUNTER — TRANSCRIBE ORDERS (OUTPATIENT)
Dept: OTHER | Age: 52
End: 2022-12-12

## 2022-12-12 DIAGNOSIS — Z12.11 ENCOUNTER FOR SCREENING COLONOSCOPY: Primary | ICD-10-CM

## 2022-12-15 ENCOUNTER — TELEPHONE (OUTPATIENT)
Dept: GASTROENTEROLOGY | Facility: CLINIC | Age: 52
End: 2022-12-15

## 2022-12-15 NOTE — TELEPHONE ENCOUNTER
Screening Questions  BLUE  KIND OF PREP RED  LOCATION [review exclusion criteria] GREEN  SEDATION TYPE        y Are you active on mychart?       [place] Ordering/Referring Provider?        BCBS What type of coverage do you have?      n Have you had a positive covid test in the last 14 days?     26.4 1. BMI  [BMI 40+ - review exclusion criteria]    y  2. Are you able to give consent for your medical care? [IF NO,RN REVIEW]        y  3. Are you taking any prescription pain medications on a routine schedule?      n  3a. EXTENDED PREP What kind of prescription?     n 4. Do you have any chemical dependencies such as alcohol, street drugs, or methadone?    n 5. Do you have any history of post-traumatic stress syndrome, severe anxiety or history of psychosis?      **If yes 3- 5 , please schedule with MAC sedation.**          IF YES TO ANY 6 - 10 - HOSPITAL SETTING ONLY.     n 6.   Do you need assistance transferring?     n 7.   Have you had a heart or lung transplant?    n 8.   Are you currently on dialysis?   n 9.   Do you use daily home oxygen?   n 10. Do you take nitroglycerin?   10a. n If yes, how often?     11. [FEMALES]  n Are you currently pregnant?    11a. n If yes, how many weeks? [ Greater than 12 weeks, OR NEEDED]    n 12. Do you have Pulmonary Hypertension? *NEED PAC APPT AT [facility]*     n 13. [review exclusion criteria]  Do you have any implantable devices in your body (pacemaker, defib, LVAD)?    n 14. In the past 6 months, have you had any heart related issues including cardiomyopathy or heart attack?     14a. n If yes, did it require cardiac stenting if so when?     n 15. Have you had a stroke or Transient ischemic attack (TIA - aka  mini stroke ) within 6 months?      n 16. Do you have mod to severe Obstructive Sleep Apnea?  [Hospital only - Ok at [place]]    n 17. Do you have SEVERE AND UNCONTROLLED asthma? *NEED PAC APPT AT [facility]*     n 18. Are you currently taking any blood thinners?     18a. If yes,  "inform patient to \"follow up w/ ordering provider for bridging instructions.\"    n 19. Do you take the medication Phentermine?    19a. If yes, \"Hold for 7 days before procedure.  Please consult your prescribing provider if you have questions about holding this medication.\"     n  20. Do you have chronic kidney disease?      n  21. Do you have a diagnosis of diabetes?     n  22. On a regular basis do you go 3-5 days between bowel movements?      23. Preferred LOCAL Pharmacy for Pre Prescription    [ LIST ONLY ONE PHARMACY]          [facility] #[id number] - [place], [place] - [address] AT [facility] OF [address]        - CLOSING REMINDERS -    Informed patient they will need an adult    Cannot take any type of public or medical transportation alone    Conscious Sedation- Needs  for 6 hours after the procedure       MAC/General-Needs  for 24 hours after procedure    Pre-Procedure Covid test to be completed [[facility] PCR Testing Required]    Confirmed Nurse will call to complete assessment       - SCHEDULING DETAILS -  n Hospital Setting Required? If yes, what is the exclusion?:    [place]  Surgeon    3/28/23  Date of Procedure  Lower Endoscopy [Colonoscopy]  Type of Procedure Scheduled  [facility]   [facility]-If you answer yes to questions #8, #20, #21Which Colonoscopy Prep was Sent?     MAC Sedation Type     n PAC / Pre-op Required                 "

## 2022-12-19 ENCOUNTER — ANCILLARY PROCEDURE (OUTPATIENT)
Dept: GENERAL RADIOLOGY | Facility: CLINIC | Age: 52
End: 2022-12-19
Attending: ORTHOPAEDIC SURGERY
Payer: COMMERCIAL

## 2022-12-19 ENCOUNTER — OFFICE VISIT (OUTPATIENT)
Dept: ORTHOPEDICS | Facility: CLINIC | Age: 52
End: 2022-12-19
Payer: COMMERCIAL

## 2022-12-19 DIAGNOSIS — M25.511 CHRONIC RIGHT SHOULDER PAIN: ICD-10-CM

## 2022-12-19 DIAGNOSIS — M25.512 CHRONIC LEFT SHOULDER PAIN: Primary | ICD-10-CM

## 2022-12-19 DIAGNOSIS — M25.512 CHRONIC LEFT SHOULDER PAIN: ICD-10-CM

## 2022-12-19 DIAGNOSIS — M19.012 ARTHRITIS OF BOTH GLENOHUMERAL JOINTS: Primary | ICD-10-CM

## 2022-12-19 DIAGNOSIS — G89.29 CHRONIC LEFT SHOULDER PAIN: ICD-10-CM

## 2022-12-19 DIAGNOSIS — G89.29 CHRONIC RIGHT SHOULDER PAIN: ICD-10-CM

## 2022-12-19 DIAGNOSIS — M19.011 ARTHRITIS OF BOTH GLENOHUMERAL JOINTS: Primary | ICD-10-CM

## 2022-12-19 DIAGNOSIS — G89.29 CHRONIC LEFT SHOULDER PAIN: Primary | ICD-10-CM

## 2022-12-19 PROCEDURE — 73030 X-RAY EXAM OF SHOULDER: CPT | Mod: LT | Performed by: RADIOLOGY

## 2022-12-19 PROCEDURE — 99213 OFFICE O/P EST LOW 20 MIN: CPT | Performed by: ORTHOPAEDIC SURGERY

## 2022-12-19 NOTE — LETTER
12/19/2022         RE: Yoni Hernandez  [address]  [place] [address]        Dear Colleague,    Thank you for referring your patient, Yoni Hernandez, to the [facility] [place]. Please see a copy of my visit note below.    CHIEF CONCERN:  Bilateral shoulder pain.    HISTORY OF PRESENT ILLNESS:  Mr. Hernandez returns today for followup.  He has done pretty well.  He was doing great until recently when he had increasing pain in his right shoulder.  He got an injection.  This calmed things down.    He has noticed some increasing crepitation.  He is curious about what the options are.    PHYSICAL EXAMINATION:  Today he has bilateral 160 degrees of forward elevation, 60 degrees of external rotation to side and internal rotation back to L1.  He has 5/5 forward elevator and external rotator strength.    IMAGING:  Radiographs of the bilateral shoulders were reviewed electronically by me.  These show end-stage glenohumeral arthritis, but no progression from previous imaging.    ASSESSMENT:  Bilateral shoulder arthritis.    PLAN:  I had a nice talk with Mr. Hernandez today.  We talked about the fact that I did not see any progression of his arthritis to the point where it was surgically urgent.  I believe he would do well with continued management like he has been doing.  This would consist of oral diclofenac, which he was prescribed elsewhere, and we talked about taking 1 g of Tylenol 3 times a day for these flare ups or even in longer term.  I would like to see him back in about 2 years with repeat radiographs of the bilateral shoulders or sooner should any additional problems arise.    Jabier Lacy MD

## 2022-12-19 NOTE — NURSING NOTE
Reason For Visit:   Chief Complaint   Patient presents with     RECHECK     Follow up 1 year on bilateral shoulder      PCP: Prashant Gilbert        ?  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  Work status?  Full time.  Date of injury: None  Date of surgery: None     Smoker: No        Right  hand dominant      SANE score  Affected shoulder: Bilateral   Right shoulder SANE: 80  Left shoulder SANE: 80    There were no vitals taken for this visit.      Pain Assessment  Patient Currently in Pain: Yes  0-10 Pain Scale: 2  Primary Pain Location: Shoulder    Arabella Robison LPN

## 2022-12-19 NOTE — PROGRESS NOTES
CHIEF CONCERN:  Bilateral shoulder pain.    HISTORY OF PRESENT ILLNESS:  Mr. Hernandez returns today for followup.  He has done pretty well.  He was doing great until recently when he had increasing pain in his right shoulder.  He got an injection.  This calmed things down.    He has noticed some increasing crepitation.  He is curious about what the options are.    PHYSICAL EXAMINATION:  Today he has bilateral 160 degrees of forward elevation, 60 degrees of external rotation to side and internal rotation back to L1.  He has 5/5 forward elevator and external rotator strength.    IMAGING:  Radiographs of the bilateral shoulders were reviewed electronically by me.  These show end-stage glenohumeral arthritis, but no progression from previous imaging.    ASSESSMENT:  Bilateral shoulder arthritis.    PLAN:  I had a nice talk with Mr. Hernandez today.  We talked about the fact that I did not see any progression of his arthritis to the point where it was surgically urgent.  I believe he would do well with continued management like he has been doing.  This would consist of oral diclofenac, which he was prescribed elsewhere, and we talked about taking 1 g of Tylenol 3 times a day for these flare ups or even in longer term.  I would like to see him back in about 2 years with repeat radiographs of the bilateral shoulders or sooner should any additional problems arise.

## 2023-02-11 ENCOUNTER — MYC MEDICAL ADVICE (OUTPATIENT)
Dept: ORTHOPEDICS | Facility: CLINIC | Age: 53
End: 2023-02-11

## 2023-02-23 DIAGNOSIS — S89.92XA INJURY OF LEFT KNEE, INITIAL ENCOUNTER: ICD-10-CM

## 2023-02-23 RX ORDER — DICLOFENAC SODIUM 75 MG/1
75 TABLET, DELAYED RELEASE ORAL 2 TIMES DAILY PRN
Qty: 62 TABLET | Refills: 1 | Status: SHIPPED | OUTPATIENT
Start: 2023-02-23 | End: 2023-08-29

## 2023-03-08 ENCOUNTER — TELEPHONE (OUTPATIENT)
Dept: ORTHOPEDICS | Facility: CLINIC | Age: 53
End: 2023-03-08
Payer: COMMERCIAL

## 2023-03-08 NOTE — TELEPHONE ENCOUNTER
Based on prior staff message from Dr. Alejandro, I called the patient to assist in scheduling an appointment for their shoulder as they previously were requesting an urgent shoulder injection. The patient did cancel their upcoming appointment with Dr. Lama on 3/9/23. Left voicemail to call back if needing shoulder injection. Rachael Zee ATC on 3/8/2023 at 1:09 PM

## 2023-03-10 ENCOUNTER — TELEPHONE (OUTPATIENT)
Dept: GASTROENTEROLOGY | Facility: CLINIC | Age: 53
End: 2023-03-10

## 2023-03-10 DIAGNOSIS — Z12.11 ENCOUNTER FOR SCREENING COLONOSCOPY: Primary | ICD-10-CM

## 2023-03-10 RX ORDER — BISACODYL 5 MG/1
TABLET, DELAYED RELEASE ORAL
Qty: 4 TABLET | Refills: 0 | Status: SHIPPED | OUTPATIENT
Start: 2023-03-10

## 2023-03-10 NOTE — TELEPHONE ENCOUNTER
Attempted to contact patient regarding upcoming Colonoscopy  procedure on 3/28/2023 for pre assessment questions. No answer.     Left message to return call to [phone] #[phone]    Dania Corley RN  Endoscopy Procedure Pre Assessment RN

## 2023-03-10 NOTE — TELEPHONE ENCOUNTER
Patient scheduled for Colonoscopy  on 3/28/2023.     Discuss Covid policy.     Pre op exam needed? N/A    Arrival time: 0700. Procedure time 0800    [facility]; [address] [place] [address], [address] Floor, [place] [address]    Sedation type: MAC    Anticoagulations? No    Electronic implanted devices? No    Diabetic? No    Indication for procedure: screening     Bowel prep recommendation: Standard Golytely     Prep instructions sent via [employer]     Bowel prep script sent to    [facility] #[id number] - [place], [place] - [address] AT [facility] [address]    Pre visit planning completed.    Dania Corley RN  Endoscopy Procedure Pre Assessment RN

## 2023-03-13 ENCOUNTER — TELEPHONE (OUTPATIENT)
Dept: GASTROENTEROLOGY | Facility: CLINIC | Age: 53
End: 2023-03-13
Payer: COMMERCIAL

## 2023-03-13 NOTE — TELEPHONE ENCOUNTER
Caller: Yoni Hernandez  Reason for Reschedule/Cancellation (please be detailed, any staff messages or encounters to note?): PER PATIENT CHANGE DATE      Prior to reschedule please review:    Ordering Provider:Prashant Gilbert     Sedation per order:MODERATE    Does patient have any ASC Exclusions, please identify?: N      Notes on Cancelled Procedure:    Procedure:Lower Endoscopy [Colonoscopy]     Date: 03/28/2023    Location:[facility] [place]; [address] [place] [address], [address] [place] [address]    Surgeon: LUCA        Rescheduled: Yes    Procedure: Lower Endoscopy [Colonoscopy]     Date: 06/13/2023    Location:[facility] [place]; [address] [place] [address], [place] [address]    Surgeon: RADHA    Sedation Level Scheduled  MAC,  Reason for Sedation Level PRESCRIPTION PAIN MEDS    Prep/Instructions updated and sent: CAMERON

## 2023-03-31 ENCOUNTER — APPOINTMENT (OUTPATIENT)
Dept: ULTRASOUND IMAGING | Facility: CLINIC | Age: 53
End: 2023-03-31
Attending: STUDENT IN AN ORGANIZED HEALTH CARE EDUCATION/TRAINING PROGRAM
Payer: COMMERCIAL

## 2023-03-31 ENCOUNTER — HOSPITAL ENCOUNTER (EMERGENCY)
Facility: CLINIC | Age: 53
Discharge: HOME | End: 2023-03-31
Attending: STUDENT IN AN ORGANIZED HEALTH CARE EDUCATION/TRAINING PROGRAM | Admitting: STUDENT IN AN ORGANIZED HEALTH CARE EDUCATION/TRAINING PROGRAM
Payer: COMMERCIAL

## 2023-03-31 VITALS
SYSTOLIC BLOOD PRESSURE: 151 MMHG | OXYGEN SATURATION: 100 % | TEMPERATURE: 97.5 F | HEART RATE: 59 BPM | DIASTOLIC BLOOD PRESSURE: 95 MMHG | RESPIRATION RATE: 20 BRPM

## 2023-03-31 DIAGNOSIS — R10.11 ABDOMINAL PAIN, RIGHT UPPER QUADRANT: ICD-10-CM

## 2023-03-31 LAB
ALBUMIN SERPL BCG-MCNC: 4.2 G/DL (ref 3.5–5.2)
ALP SERPL-CCNC: 56 U/L (ref 40–129)
ALT SERPL W P-5'-P-CCNC: 60 U/L (ref 10–50)
ANION GAP SERPL CALCULATED.3IONS-SCNC: 9 MMOL/L (ref 7–15)
AST SERPL W P-5'-P-CCNC: 37 U/L (ref 10–50)
ATRIAL RATE - MUSE: 62 BPM
BASOPHILS # BLD AUTO: 0 10E3/UL (ref 0–0.2)
BASOPHILS NFR BLD AUTO: 1 %
BILIRUB SERPL-MCNC: 0.3 MG/DL
BUN SERPL-MCNC: 13.3 MG/DL (ref 6–20)
CALCIUM SERPL-MCNC: 9.1 MG/DL (ref 8.6–10)
CHLORIDE SERPL-SCNC: 105 MMOL/L (ref 98–107)
CREAT SERPL-MCNC: 1.17 MG/DL (ref 0.67–1.17)
DEPRECATED HCO3 PLAS-SCNC: 24 MMOL/L (ref 22–29)
DIASTOLIC BLOOD PRESSURE - MUSE: NORMAL MMHG
EOSINOPHIL # BLD AUTO: 0.1 10E3/UL (ref 0–0.7)
EOSINOPHIL NFR BLD AUTO: 2 %
ERYTHROCYTE [DISTWIDTH] IN BLOOD BY AUTOMATED COUNT: 13 % (ref 10–15)
GFR SERPL CREATININE-BSD FRML MDRD: 75 ML/MIN/1.73M2
GLUCOSE SERPL-MCNC: 98 MG/DL (ref 70–99)
HCT VFR BLD AUTO: 45.3 % (ref 40–53)
HGB BLD-MCNC: 14.9 G/DL (ref 13.3–17.7)
IMM GRANULOCYTES # BLD: 0 10E3/UL
IMM GRANULOCYTES NFR BLD: 0 %
INTERPRETATION ECG - MUSE: NORMAL
LIPASE SERPL-CCNC: 36 U/L (ref 13–60)
LYMPHOCYTES # BLD AUTO: 1.4 10E3/UL (ref 0.8–5.3)
LYMPHOCYTES NFR BLD AUTO: 22 %
MCH RBC QN AUTO: 30.7 PG (ref 26.5–33)
MCHC RBC AUTO-ENTMCNC: 32.9 G/DL (ref 31.5–36.5)
MCV RBC AUTO: 93 FL (ref 78–100)
MONOCYTES # BLD AUTO: 0.5 10E3/UL (ref 0–1.3)
MONOCYTES NFR BLD AUTO: 8 %
NEUTROPHILS # BLD AUTO: 4.4 10E3/UL (ref 1.6–8.3)
NEUTROPHILS NFR BLD AUTO: 67 %
NRBC # BLD AUTO: 0 10E3/UL
NRBC BLD AUTO-RTO: 0 /100
P AXIS - MUSE: 28 DEGREES
PLATELET # BLD AUTO: 243 10E3/UL (ref 150–450)
POTASSIUM SERPL-SCNC: 4.7 MMOL/L (ref 3.4–5.3)
PR INTERVAL - MUSE: 186 MS
PROT SERPL-MCNC: 6.8 G/DL (ref 6.4–8.3)
QRS DURATION - MUSE: 108 MS
QT - MUSE: 380 MS
QTC - MUSE: 385 MS
R AXIS - MUSE: 24 DEGREES
RBC # BLD AUTO: 4.85 10E6/UL (ref 4.4–5.9)
SODIUM SERPL-SCNC: 138 MMOL/L (ref 136–145)
SYSTOLIC BLOOD PRESSURE - MUSE: NORMAL MMHG
T AXIS - MUSE: 34 DEGREES
TROPONIN T SERPL HS-MCNC: 8 NG/L
VENTRICULAR RATE- MUSE: 62 BPM
WBC # BLD AUTO: 6.4 10E3/UL (ref 4–11)

## 2023-03-31 PROCEDURE — 99284 EMERGENCY DEPT VISIT MOD MDM: CPT | Mod: 25 | Performed by: STUDENT IN AN ORGANIZED HEALTH CARE EDUCATION/TRAINING PROGRAM

## 2023-03-31 PROCEDURE — 76705 ECHO EXAM OF ABDOMEN: CPT | Mod: 26 | Performed by: STUDENT IN AN ORGANIZED HEALTH CARE EDUCATION/TRAINING PROGRAM

## 2023-03-31 PROCEDURE — 99285 EMERGENCY DEPT VISIT HI MDM: CPT | Mod: 25 | Performed by: STUDENT IN AN ORGANIZED HEALTH CARE EDUCATION/TRAINING PROGRAM

## 2023-03-31 PROCEDURE — 93010 ELECTROCARDIOGRAM REPORT: CPT | Performed by: STUDENT IN AN ORGANIZED HEALTH CARE EDUCATION/TRAINING PROGRAM

## 2023-03-31 PROCEDURE — 80053 COMPREHEN METABOLIC PANEL: CPT | Performed by: STUDENT IN AN ORGANIZED HEALTH CARE EDUCATION/TRAINING PROGRAM

## 2023-03-31 PROCEDURE — 36415 COLL VENOUS BLD VENIPUNCTURE: CPT | Performed by: STUDENT IN AN ORGANIZED HEALTH CARE EDUCATION/TRAINING PROGRAM

## 2023-03-31 PROCEDURE — 93005 ELECTROCARDIOGRAM TRACING: CPT | Performed by: STUDENT IN AN ORGANIZED HEALTH CARE EDUCATION/TRAINING PROGRAM

## 2023-03-31 PROCEDURE — 84484 ASSAY OF TROPONIN QUANT: CPT | Performed by: STUDENT IN AN ORGANIZED HEALTH CARE EDUCATION/TRAINING PROGRAM

## 2023-03-31 PROCEDURE — 85014 HEMATOCRIT: CPT | Performed by: STUDENT IN AN ORGANIZED HEALTH CARE EDUCATION/TRAINING PROGRAM

## 2023-03-31 PROCEDURE — 76705 ECHO EXAM OF ABDOMEN: CPT

## 2023-03-31 PROCEDURE — 83690 ASSAY OF LIPASE: CPT | Performed by: STUDENT IN AN ORGANIZED HEALTH CARE EDUCATION/TRAINING PROGRAM

## 2023-03-31 PROCEDURE — 250N000013 HC RX MED GY IP 250 OP 250 PS 637: Performed by: STUDENT IN AN ORGANIZED HEALTH CARE EDUCATION/TRAINING PROGRAM

## 2023-03-31 RX ORDER — IBUPROFEN 600 MG/1
600 TABLET, FILM COATED ORAL ONCE
Status: COMPLETED | OUTPATIENT
Start: 2023-03-31 | End: 2023-03-31

## 2023-03-31 RX ORDER — ACETAMINOPHEN 325 MG/1
975 TABLET ORAL ONCE
Status: COMPLETED | OUTPATIENT
Start: 2023-03-31 | End: 2023-03-31

## 2023-03-31 RX ORDER — FAMOTIDINE 20 MG/1
20 TABLET, FILM COATED ORAL ONCE
Status: COMPLETED | OUTPATIENT
Start: 2023-03-31 | End: 2023-03-31

## 2023-03-31 RX ADMIN — IBUPROFEN 600 MG: 600 TABLET, FILM COATED ORAL at 10:21

## 2023-03-31 RX ADMIN — FAMOTIDINE 20 MG: 20 TABLET ORAL at 10:21

## 2023-03-31 RX ADMIN — ACETAMINOPHEN 975 MG: 325 TABLET ORAL at 10:21

## 2023-03-31 ASSESSMENT — ACTIVITIES OF DAILY LIVING (ADL): ADLS_ACUITY_SCORE: 35

## 2023-03-31 NOTE — DISCHARGE INSTRUCTIONS
You were seen and evaluated in the emergency department today for upper abdominal pain.  Your work-up was reassuring for acutely life-threatening causes.  A specific cause for your pain was not identified.  You may use Tylenol   (1000 mg up to 3 times a day) and Motrin/ibuprofen (600 mg or three 200mg tablets) 3-4 times a day.  As we did not find a cause for your symptoms if you develop worsening pain fevers nausea vomiting or any other concerning symptoms please return to the emergency department otherwise follow-up with your primary care doctor even if your symptoms entirely resolve.

## 2023-03-31 NOTE — ED TRIAGE NOTES
Pt arrives ambulatory to ED  States that last night he began feeling sudden RUQ abdominal pain. Describes the pain as constant pressure that increases with activity.

## 2023-03-31 NOTE — ED PROVIDER NOTES
"    [facility] EMERGENCY DEPARTMENT ([facility])    3/31/23      History     Chief Complaint   Patient presents with     Abdominal Pain     The history is provided by the patient and medical records.     Yoni Hernandez is a 53 year old male who with a past medical history of benign neoplasm of sigmoid colon, polyp of colon, external hemorrhoids, anxiety, depression, and essential hypertension presents to the ED with abdominal pain.  As per triage note, patient arrived ambulatory to the ED. He states that it was his birthday last night and he had gone out to celebrate where he ended up having \"creamy pasta and alcohol more than normal.\"  Afterwards at around 3 AM today morning, he started having pain right below his rib cage and it has been persistent and continuous since morning.  It is 2/10 when he stays still, but it is 5/10 if he walks around.  Patient reports that the pain is only on one side, mainly present in his mid abdomen and it does not radiate to his chest.  Furthermore, he states that he has a past history of high blood pressure, tendinopathy of his shoulders and is allergic to sulfa drugs.  No history of pain medications this morning. No hx of acid reflex or gastritis. No diarrhea. No blood in his stool. No abdominal tenderness in mid abdominal region. No nausea. No gallbladder hx.     Past Medical History  No past medical history on file.  Past Surgical History:   Procedure Laterality Date     COLON SURGERY       bisacodyl (DULCOLAX) 5 MG EC tablet  buPROPion (WELLBUTRIN XL) 300 MG 24 hr tablet  diclofenac (VOLTAREN) 75 MG EC tablet  ibuprofen (ADVIL/MOTRIN) 200 MG tablet  losartan (COZAAR) 50 MG tablet  polyethylene glycol (GOLYTELY) 236 g suspension  propranolol (INDERAL) 20 MG tablet      Allergies   Allergen Reactions     Sulfa Drugs Rash     Family History  Family History   Problem Relation Age of Onset     Arthritis Paternal Grandmother      Social History   Social History     Tobacco Use     " Smoking status: Never     Smokeless tobacco: Never   Substance Use Topics     Alcohol use: Yes     Alcohol/week: 14.0 standard drinks     Types: 14 Glasses of wine per week      Past medical history, past surgical history, medications, allergies, family history, and social history were reviewed with the patient. No additional pertinent items.      A complete review of systems was performed with pertinent positives and negatives noted in the HPI, and all other systems negative.    Physical Exam   BP: (!) 151/95  Pulse: 59  Temp: 97.5  F (36.4  C)  Resp: 20  SpO2: 100 %  Physical Exam  Vital Signs Reviewed  Gen: Well nourished, well developed, resting comfortably, no acute distress  HEENT: NC/AT, PERRL, EOMI, MMM  Neck: Supple, FROM  CV: Regular Rate, no murmur/rub/gallop  Lungs/Chest: Normal Effort, CTAB  Abd: Soft, nondistended.  Mild tenderness to the right upper quadrant and epigastrium without Bobo sign rigidity rebound or guarding.  MSK/Back: FROM, no visible deformity  Neuro: A&Ox3, GCS 15, CN II-XII unremarkable  Skin: Warm, Dry, Intact, no visible lesions    ED Course, Procedures, & Data   10:10 AM  The patient was seen and examined by Karson Bedolla MD. in Room VTB.   ED Course as of 03/31/23 1659   Fri Mar 31, 2023   1007 Patient seen and assessed.  Atypical ACS and hepatobiliary/pancreatitis work-up initiated.   1208 Labs reviewed.  No major abnormalities.  No leukocytosis.  Metabolic acidosis.  Slight increase in ALT.  No other major hepatobiliary abnormalities.  Ultrasound without acute pathology.   1249 Patient reevaluated in vertical triage D.  Remains comfortable.  Still with some persistent mild pain but not worsening no other additional symptoms.  Discussed lab and imaging results and discharge and follow-up plans.     Procedures       ED Course Selections:        EKG Interpretation:      Interpreted by Karson Bedolla MD  Time reviewed: 1020  Symptoms at time of EKG: Abdominal Pain   Rhythm:  normal sinus   Rate: Normal  Axis: Normal  Ectopy: none  Conduction: normal  ST Segments/ T Waves: No ST-T wave changes and No acute ischemic changes  Q Waves: none  Comparison to prior: No old EKG available    Clinical Impression: normal EKG                           Results for orders placed or performed during the hospital encounter of 03/31/23   US Abdomen Limited     Status: None    Narrative    EXAMINATION: Limited Abdominal Ultrasound, 3/31/2023 10:47 AM     COMPARISON: None available    HISTORY:   RUQ pain/tenderness s/p heavy meal and EtOH    FINDINGS:     Fluid: No evidence of ascites or pleural effusions.    Liver: The liver demonstrates increased hepatic parenchymal  echogenicity, measuring 19.5 cm in craniocaudal dimension. There is no  focal mass.     Gallbladder: There is no wall thickening, pericholecystic fluid,  positive sonographic Bobo's sign or evidence for cholelithiasis.    Bile Ducts: Both the intra- and extrahepatic biliary system are of  normal caliber.  The common bile duct measures 3 mm in diameter.    Pancreas: Visualized portions of the head and body of the pancreas are  unremarkable. Pancreas is partially obscured    Kidney: The right kidney measures 10.7 cm long. There is no  hydronephrosis or hydroureter, no shadowing renal calculi, cystic  lesion or mass.       Impression    IMPRESSION: Mild hepatomegaly with increased hepatic parenchymal  echogenicity which may be seen with parenchymal liver disease  including hepatic steatosis.    I have personally reviewed the examination and initial interpretation  and I agree with the findings.    JOÃO HALLMAN MD         SYSTEM ID:  [id number]   Comprehensive metabolic panel     Status: Abnormal   Result Value Ref Range    Sodium 138 136 - 145 mmol/L    Potassium 4.7 3.4 - 5.3 mmol/L    Chloride 105 98 - 107 mmol/L    Carbon Dioxide (CO2) 24 22 - 29 mmol/L    Anion Gap 9 7 - 15 mmol/L    Urea Nitrogen 13.3 6.0 - 20.0 mg/dL    Creatinine  1.17 0.67 - 1.17 mg/dL    Calcium 9.1 8.6 - 10.0 mg/dL    Glucose 98 70 - 99 mg/dL    Alkaline Phosphatase 56 40 - 129 U/L    AST 37 10 - 50 U/L    ALT 60 (H) 10 - 50 U/L    Protein Total 6.8 6.4 - 8.3 g/dL    Albumin 4.2 3.5 - 5.2 g/dL    Bilirubin Total 0.3 <=1.2 mg/dL    GFR Estimate 75 >60 mL/min/1.73m2   Lipase     Status: Normal   Result Value Ref Range    Lipase 36 13 - 60 U/L   Troponin T, High Sensitivity     Status: Normal   Result Value Ref Range    Troponin T, High Sensitivity 8 <=22 ng/L   CBC with platelets and differential     Status: None   Result Value Ref Range    WBC Count 6.4 4.0 - 11.0 10e3/uL    RBC Count 4.85 4.40 - 5.90 10e6/uL    Hemoglobin 14.9 13.3 - 17.7 g/dL    Hematocrit 45.3 40.0 - 53.0 %    MCV 93 78 - 100 fL    MCH 30.7 26.5 - 33.0 pg    MCHC 32.9 31.5 - 36.5 g/dL    RDW 13.0 10.0 - 15.0 %    Platelet Count 243 150 - 450 10e3/uL    % Neutrophils 67 %    % Lymphocytes 22 %    % Monocytes 8 %    % Eosinophils 2 %    % Basophils 1 %    % Immature Granulocytes 0 %    NRBCs per 100 WBC 0 <1 /100    Absolute Neutrophils 4.4 1.6 - 8.3 10e3/uL    Absolute Lymphocytes 1.4 0.8 - 5.3 10e3/uL    Absolute Monocytes 0.5 0.0 - 1.3 10e3/uL    Absolute Eosinophils 0.1 0.0 - 0.7 10e3/uL    Absolute Basophils 0.0 0.0 - 0.2 10e3/uL    Absolute Immature Granulocytes 0.0 <=0.4 10e3/uL    Absolute NRBCs 0.0 10e3/uL   EKG 12-lead, tracing only     Status: None   Result Value Ref Range    Systolic Blood Pressure  mmHg    Diastolic Blood Pressure  mmHg    Ventricular Rate 62 BPM    Atrial Rate 62 BPM    VA Interval 186 ms    QRS Duration 108 ms     ms    QTc 385 ms    P Axis 28 degrees    R AXIS 24 degrees    T Axis 34 degrees    Interpretation ECG       Sinus rhythm with sinus arrhythmia  Normal ECG  Unconfirmed report - interpretation of this ECG is computer generated - see medical record for final interpretation    Confirmed by - EMERGENCY ROOM, PHYSICIAN ([id number]),  ADEBAYO ORO ([id number]) on  3/31/2023 12:28:15 PM     CBC with platelets differential     Status: None    Narrative    The following orders were created for panel order CBC with platelets differential.  Procedure                               Abnormality         Status                     ---------                               -----------         ------                     CBC with platelets and d...[[id number]]                      Final result                 Please view results for these tests on the individual orders.     Medications   acetaminophen (TYLENOL) tablet 975 mg (975 mg Oral $Given 3/31/23 1021)   famotidine (PEPCID) tablet 20 mg (20 mg Oral $Given 3/31/23 1021)   ibuprofen (ADVIL/MOTRIN) tablet 600 mg (600 mg Oral $Given 3/31/23 1021)     Labs Ordered and Resulted from Time of ED Arrival to Time of ED Departure   COMPREHENSIVE METABOLIC PANEL - Abnormal       Result Value    Sodium 138      Potassium 4.7      Chloride 105      Carbon Dioxide (CO2) 24      Anion Gap 9      Urea Nitrogen 13.3      Creatinine 1.17      Calcium 9.1      Glucose 98      Alkaline Phosphatase 56      AST 37      ALT 60 (*)     Protein Total 6.8      Albumin 4.2      Bilirubin Total 0.3      GFR Estimate 75     LIPASE - Normal    Lipase 36     TROPONIN T, HIGH SENSITIVITY - Normal    Troponin T, High Sensitivity 8     CBC WITH PLATELETS AND DIFFERENTIAL    WBC Count 6.4      RBC Count 4.85      Hemoglobin 14.9      Hematocrit 45.3      MCV 93      MCH 30.7      MCHC 32.9      RDW 13.0      Platelet Count 243      % Neutrophils 67      % Lymphocytes 22      % Monocytes 8      % Eosinophils 2      % Basophils 1      % Immature Granulocytes 0      NRBCs per 100 WBC 0      Absolute Neutrophils 4.4      Absolute Lymphocytes 1.4      Absolute Monocytes 0.5      Absolute Eosinophils 0.1      Absolute Basophils 0.0      Absolute Immature Granulocytes 0.0      Absolute NRBCs 0.0       US Abdomen Limited   Final Result   IMPRESSION: Mild hepatomegaly with  increased hepatic parenchymal   echogenicity which may be seen with parenchymal liver disease   including hepatic steatosis.      I have personally reviewed the examination and initial interpretation   and I agree with the findings.      JOÃO HALLMAN MD            SYSTEM ID:  [id number]             Critical care was not performed.     Medical Decision Making  The patient's presentation was of moderate complexity (an undiagnosed new problem with uncertain diagnosis).    The patient's evaluation involved:  ordering and/or review of 3+ test(s) in this encounter (see separate area of note for details)    The patient's management necessitated moderate risk (prescription drug management including medications given in the ED).    Assessment & Plan    Patient is a 53-year-old male who reports significant past medical history of hypertension without significant abdominal history presenting for evaluation of right upper quadrant discomfort after celebrating his [facility].  Ate a rich meal and more alcohol than normal.  His triage vitals are notable for slightly elevated blood pressure otherwise were unremarkable.  Abdominal examination was nonperitoneal.  Differential includes pancreatitis, biliary colic, cholecystitis, dyspepsia.  I will give some Tylenol Motrin and famotidine for symptom control.    Review of abdominal ultrasound shows no evidence of acute biliary pathology.  Mild hepatomegaly with some increased echogenicity with no obvious acute findings.  Not having diarrhea or other symptoms of acute hepatitis however metabolic panel is pending.  Lipase within normal limits pancreatitis unlikely.  High-sensitivity troponin is negative at 8, given constant symptoms since 3 AM ACS is essentially excluded.  His twelve-lead EKG is reassuring showing a normal sinus rhythm with no acute ischemic changes.  He has no pulmonary symptoms to suggest an atypical presentation of pneumonia.  No urinary symptoms and  location of pain does not seem consistent with nephrolithiasis or pyelonephritis.    Patient remained comfortable and stable in the emergency department.  Final metabolic panel resulted and was unremarkable.  Still with some mild pain.  Discussed that I thought CT imaging was not necessary today and patient was comfortable with deferring this at this time.  He appears stable for outpatient follow-up with primary care regarding the ultrasound findings concerning for possible fatty liver disease.    Patient encouraged to use Tylenol and Motrin for pain control, follow-up with primary care.  Return precautions for worsening pain fevers nausea vomiting or other concerning symptoms were discussed.    I have reviewed the nursing notes. I have reviewed the findings, diagnosis, plan and need for follow up with the patient.    Discharge Medication List as of 3/31/2023 12:58 PM          Final diagnoses:   Abdominal pain, right upper quadrant   I, ALON CRAIG, am serving as a trained medical scribe to document services personally performed by Karson Calderon MD, based on the provider's statements to me.     I, Karson Calderon MD, was physically present and have reviewed and verified the accuracy of this note documented by ALON CRAIG.    Karson Calderon Jr., MD   [facility] EMERGENCY DEPARTMENT  3/31/2023     Karson Calderon MD  03/31/23 1342

## 2023-06-01 NOTE — TELEPHONE ENCOUNTER
Rescheduled procedure 6/13/23    Attempted to contact patient regarding upcoming Colonoscopy  procedure on 6/13/23 for pre assessment questions. No answer.     Left message to return call to [phone] #4    Discuss Covid policy and designated  policy.    Pre op exam? N/A    Arrival time: 0700. Procedure time: 0800    [facility]; [address] [place] [address], [address] Floor, [place] [address]    Sedation type: MAC    NSAIDs? Yes.  Diclofenac (Voltaren).  Holding interval of 1 day before procedure. and Ibuprofen (Advil, Motrin).  Holding interval of 1 day before procedure.    Indication for procedure: screening colonoscopy    Bowel prep recommendation: Standard Golytely  d/t mg citrate recall. Verify if patient has picked this up from rescheduled procedure. If not, could change to miralax w/o mg citrate prep.     Prep instructions have not been resent. Verify bowel prep and then resend.       Mindy Rivero RN  Endoscopy Procedure Pre Assessment RN

## 2023-06-02 ENCOUNTER — TELEPHONE (OUTPATIENT)
Dept: GASTROENTEROLOGY | Facility: CLINIC | Age: 53
End: 2023-06-02
Payer: COMMERCIAL

## 2023-06-02 NOTE — TELEPHONE ENCOUNTER
Caller: Yoni Hernandez    Reason for Reschedule/Cancellation (please be detailed, any staff messages or encounters to note?): pt called to reschedule      Prior to reschedule please review:    Ordering Provider:DANIELLE MARI    Sedation per order:MAC    Does patient have any ASC Exclusions, please identify?: NO      Notes on Cancelled Procedure:    Procedure:Lower Endoscopy [Colonoscopy]     Date: 6/13/23    Location:[facility]; [address] [place] [address], [address] [place] [address]    Surgeon: RADHA        Rescheduled: Yes    Procedure: Lower Endoscopy [Colonoscopy]     Date: 8/29/23    Location:[facility]; [address] [place] [address], [address] [place] [address]    Surgeon: RADHA    Sedation Level Scheduled  MODERATE,  Reason for Sedation Level PER ORDER    Prep/Instructions updated and sent: YES/CAMEORN

## 2023-07-19 NOTE — TELEPHONE ENCOUNTER
1st Attempt. Called pt to reschedule provider is unavailable. Left a voicemail and sent a MyChart Message/letter sent. Put case in the depot. SW

## 2023-07-20 NOTE — TELEPHONE ENCOUNTER
Caller: Yoni Hernandez   Reason for Reschedule/Cancellation (please be detailed, any staff messages or encounters to note?):     RADHA OUT OF OFFICE       Prior to reschedule please review:    Ordering Provider:KAMALJIT     Sedation per order: MODERATE     Does patient have any ASC Exclusions, please identify?: N       Notes on Cancelled Procedure:  1. Procedure:Lower Endoscopy [Colonoscopy]   2. Date: 08/29/2023  3. Location:[facility]; [address] [place] [address], [address] Floor, [place] [address]  4. Surgeon: RADHA         Rescheduled: NO - PT WILL CALL BACK TO R/S FOR EITHER AUG 3RD OR AUG 29TH     **(CS) OKAY  **ANY PROVIDER OKAY   **CASE IN [facility]

## 2023-07-21 NOTE — TELEPHONE ENCOUNTER
Rescheduled: Yes    Procedure: Lower Endoscopy [Colonoscopy]     Date: 8/29/2023    [facility]; [address] [place] [address], [address] Floor, [place] [address]    Surgeon: Jac    Sedation Level Scheduled  MAC,  Reason for Sedation Level Prescription pain meds    Prep/Instructions updated and sent: Isha.

## 2023-08-14 NOTE — TELEPHONE ENCOUNTER
Rescheduled procedure 8/29/23    Attempted to contact patient in order to complete pre assessment questions.     No answer. Left message to return call to [phone] option 4      Procedure details:    Patient scheduled for Colonoscopy  on 8/29/23.     Arrival time: 0730. Procedure time 0830    Pre op exam needed? N/A    [facility]; [address] [place] [address], [address] Floor, [place] [address]    Sedation type: Conscious sedation       Medication review:    NSAIDS? Yes.  Diclofenac (Voltaren).  Holding interval of 1 day before procedure. and Ibuprofen (Advil, Motrin).  Holding interval of 1 day before procedure.      Prep for procedure:     Bowel prep recommendation: Miralax prep without magnesium citrate   Due to: standard bowel prep.    Prep instructions sent via Stio.     Mindy Rivero RN  Endoscopy Procedure Pre Assessment RN

## 2023-08-16 NOTE — TELEPHONE ENCOUNTER
Pre assessment completed for upcoming procedure.   (Please see previous telephone encounter notes for complete details)    Patient  returned call.       Procedure details:    Arrival time and facility location reviewed    Pre op exam needed? N/A    Designated  policy reviewed. Instructed to have someone stay 6 hours post procedure.     COVID policy reviewed.      Medication review:    Medications reviewed. Please see supporting documentation below. Holding recommendations discussed (if applicable).       Prep for procedure:     Reviewed procedure prep instructions.       Additional information needed?  N/A      Patient  verbalized understanding and had no questions or concerns at this time.      Mindy Salazar RN  Endoscopy Procedure Pre Assessment RN  [phone] option 4

## 2023-08-29 ENCOUNTER — HOSPITAL ENCOUNTER (OUTPATIENT)
Facility: AMBULATORY SURGERY CENTER | Age: 53
Discharge: HOME | End: 2023-08-29
Attending: INTERNAL MEDICINE | Admitting: INTERNAL MEDICINE
Payer: COMMERCIAL

## 2023-08-29 VITALS
RESPIRATION RATE: 16 BRPM | HEART RATE: 68 BPM | OXYGEN SATURATION: 98 % | SYSTOLIC BLOOD PRESSURE: 130 MMHG | DIASTOLIC BLOOD PRESSURE: 69 MMHG | TEMPERATURE: 97 F

## 2023-08-29 LAB — COLONOSCOPY: NORMAL

## 2023-08-29 PROCEDURE — 45385 COLONOSCOPY W/LESION REMOVAL: CPT | Mod: 33 | Performed by: INTERNAL MEDICINE

## 2023-08-29 PROCEDURE — 88305 TISSUE EXAM BY PATHOLOGIST: CPT | Mod: 26 | Performed by: PATHOLOGY

## 2023-08-29 PROCEDURE — 88305 TISSUE EXAM BY PATHOLOGIST: CPT | Mod: TC | Performed by: INTERNAL MEDICINE

## 2023-08-29 RX ORDER — LIDOCAINE 40 MG/G
CREAM TOPICAL
Status: DISCONTINUED | OUTPATIENT
Start: 2023-08-29 | End: 2023-08-29 | Stop reason: HOSPADM

## 2023-08-29 RX ORDER — ONDANSETRON 2 MG/ML
4 INJECTION INTRAMUSCULAR; INTRAVENOUS
Status: DISCONTINUED | OUTPATIENT
Start: 2023-08-29 | End: 2023-08-29 | Stop reason: HOSPADM

## 2023-08-29 RX ORDER — PROCHLORPERAZINE MALEATE 10 MG
10 TABLET ORAL EVERY 6 HOURS PRN
Status: DISCONTINUED | OUTPATIENT
Start: 2023-08-29 | End: 2023-08-30 | Stop reason: HOSPADM

## 2023-08-29 RX ORDER — ONDANSETRON 4 MG/1
4 TABLET, ORALLY DISINTEGRATING ORAL EVERY 6 HOURS PRN
Status: DISCONTINUED | OUTPATIENT
Start: 2023-08-29 | End: 2023-08-30 | Stop reason: HOSPADM

## 2023-08-29 RX ORDER — SODIUM CHLORIDE, SODIUM LACTATE, POTASSIUM CHLORIDE, CALCIUM CHLORIDE 600; 310; 30; 20 MG/100ML; MG/100ML; MG/100ML; MG/100ML
INJECTION, SOLUTION INTRAVENOUS CONTINUOUS
Status: DISCONTINUED | OUTPATIENT
Start: 2023-08-29 | End: 2023-08-29 | Stop reason: HOSPADM

## 2023-08-29 RX ORDER — NALOXONE HYDROCHLORIDE 0.4 MG/ML
0.4 INJECTION, SOLUTION INTRAMUSCULAR; INTRAVENOUS; SUBCUTANEOUS
Status: DISCONTINUED | OUTPATIENT
Start: 2023-08-29 | End: 2023-08-30 | Stop reason: HOSPADM

## 2023-08-29 RX ORDER — ONDANSETRON 2 MG/ML
4 INJECTION INTRAMUSCULAR; INTRAVENOUS EVERY 6 HOURS PRN
Status: DISCONTINUED | OUTPATIENT
Start: 2023-08-29 | End: 2023-08-30 | Stop reason: HOSPADM

## 2023-08-29 RX ORDER — OMEPRAZOLE 20 MG/1
20 TABLET, DELAYED RELEASE ORAL DAILY
COMMUNITY

## 2023-08-29 RX ORDER — FENTANYL CITRATE 50 UG/ML
INJECTION, SOLUTION INTRAMUSCULAR; INTRAVENOUS DAILY PRN
Status: DISCONTINUED | OUTPATIENT
Start: 2023-08-29 | End: 2023-08-29 | Stop reason: HOSPADM

## 2023-08-29 RX ORDER — FLUMAZENIL 0.1 MG/ML
0.2 INJECTION, SOLUTION INTRAVENOUS
Status: ACTIVE | OUTPATIENT
Start: 2023-08-29 | End: 2023-08-29

## 2023-08-29 RX ORDER — NALOXONE HYDROCHLORIDE 0.4 MG/ML
0.2 INJECTION, SOLUTION INTRAMUSCULAR; INTRAVENOUS; SUBCUTANEOUS
Status: DISCONTINUED | OUTPATIENT
Start: 2023-08-29 | End: 2023-08-30 | Stop reason: HOSPADM

## 2023-08-29 RX ADMIN — SODIUM CHLORIDE, SODIUM LACTATE, POTASSIUM CHLORIDE, CALCIUM CHLORIDE: 600; 310; 30; 20 INJECTION, SOLUTION INTRAVENOUS at 08:17

## 2023-08-29 NOTE — H&P
Yoni Hernandez  [id number]  male  53 year old      Reason for procedure/surgery: screening, hx of hemorrhoids    Patient Active Problem List   Diagnosis    Tendinopathy of left rotator cuff    Tendinopathy of left biceps tendon    Chronic left shoulder pain    Chronic right shoulder pain       Past Surgical History:    Past Surgical History:   Procedure Laterality Date    COLON SURGERY         Past Medical History: No past medical history on file.    Social History:   Social History     Tobacco Use    Smoking status: Never    Smokeless tobacco: Never   Substance Use Topics    Alcohol use: Yes     Alcohol/week: 14.0 standard drinks of alcohol     Types: 14 Glasses of wine per week       Family History:   Family History   Problem Relation Age of Onset    Arthritis Paternal Grandmother        Allergies:   Allergies   Allergen Reactions    Sulfa Antibiotics Rash       Active Medications:   Current Outpatient Medications   Medication Sig Dispense Refill    bisacodyl (DULCOLAX) 5 MG EC tablet Take 2 tablets at 3 pm the day before your procedure. If your procedure is before 11 am, take 2 additional tablets at 11 pm. If your procedure is after 11 am, take 2 additional tablets at 6 am. For additional instructions refer to your colonoscopy prep instructions. 4 tablet 0    buPROPion (WELLBUTRIN XL) 300 MG 24 hr tablet Take 300 mg by mouth      diclofenac (VOLTAREN) 75 MG EC tablet Take 1 tablet (75 mg) by mouth 2 times daily as needed for moderate pain (4-6) 62 tablet 1    ibuprofen (ADVIL/MOTRIN) 200 MG tablet Take 400 mg by mouth      losartan (COZAAR) 50 MG tablet Take 75 mg by mouth      polyethylene glycol (GOLYTELY) 236 g suspension The night before the exam at 6 pm drink an 8-ounce glass every 15 minutes until the jug is half empty. If you arrive before 11 AM: Drink the other half of the [employer] jug at 11 PM night before procedure. If you arrive after 11 AM: Drink the other half of the [employer] jug at 6 AM day of  procedure. For additional instructions refer to your colonoscopy prep instructions. 4000 mL 0    propranolol (INDERAL) 20 MG tablet Take 20 mg by mouth         Systemic Review:   CONSTITUTIONAL: NEGATIVE for fever, chills, change in weight  ENT/MOUTH: NEGATIVE for ear, mouth and throat problems  RESP: NEGATIVE for significant cough or SOB  CV: NEGATIVE for chest pain, palpitations or peripheral edema    Physical Examination:   Vital Signs: BP (!) 136/90   Pulse 65   Temp 96.9  F (36.1  C) (Temporal)   Resp 16   SpO2 98%   GENERAL: healthy, alert and no distress  NECK: no adenopathy, no asymmetry, masses, or scars  RESP: lungs clear to auscultation - no rales, rhonchi or wheezes  CV: regular rate and rhythm, normal S1 S2, no S3 or S4, no murmur, click or rub, no peripheral edema and peripheral pulses strong  ABDOMEN: soft, nontender, no hepatosplenomegaly, no masses and bowel sounds normal  MS: no gross musculoskeletal defects noted, no edema    Plan: Appropriate to proceed as scheduled.      Kadi Reyna MD  8/29/2023    PCP:  Prashant Gilbert

## 2023-08-30 LAB
PATH REPORT.COMMENTS IMP SPEC: NORMAL
PATH REPORT.COMMENTS IMP SPEC: NORMAL
PATH REPORT.FINAL DX SPEC: NORMAL
PATH REPORT.GROSS SPEC: NORMAL
PATH REPORT.MICROSCOPIC SPEC OTHER STN: NORMAL
PATH REPORT.RELEVANT HX SPEC: NORMAL
PHOTO IMAGE: NORMAL

## (undated) DEVICE — KIT ENDO FIRST STEP DISINFECTANT 200ML W/POUCH EP-4

## (undated) DEVICE — SUCTION MANIFOLD NEPTUNE 2 SYS 1 PORT 702-025-000

## (undated) DEVICE — ENDO TRAP POLYP E-TRAP 00711099

## (undated) DEVICE — SNARE CAPIVATOR ROUND COLD SNR BX10 M00561101

## (undated) DEVICE — KIT ENDO TURNOVER/PROCEDURE CARRY-ON 101822

## (undated) DEVICE — TUBING SUCTION MEDI-VAC 1/4"X20' N620A

## (undated) DEVICE — GOWN IMPERVIOUS 2XL BLUE

## (undated) DEVICE — SPECIMEN CONTAINER 3OZ W/FORMALIN 59901

## (undated) DEVICE — SOL WATER IRRIG 500ML BOTTLE 2F7113

## (undated) RX ORDER — FENTANYL CITRATE 50 UG/ML
INJECTION, SOLUTION INTRAMUSCULAR; INTRAVENOUS
Status: DISPENSED
Start: 2023-08-29

## (undated) RX ORDER — TRIAMCINOLONE ACETONIDE 40 MG/ML
INJECTION, SUSPENSION INTRA-ARTICULAR; INTRAMUSCULAR
Status: DISPENSED
Start: 2022-10-26

## (undated) RX ORDER — LIDOCAINE HYDROCHLORIDE 10 MG/ML
INJECTION, SOLUTION EPIDURAL; INFILTRATION; INTRACAUDAL; PERINEURAL
Status: DISPENSED
Start: 2022-06-20

## (undated) RX ORDER — LIDOCAINE HYDROCHLORIDE 10 MG/ML
INJECTION, SOLUTION INFILTRATION; PERINEURAL
Status: DISPENSED
Start: 2018-10-03

## (undated) RX ORDER — TRIAMCINOLONE ACETONIDE 40 MG/ML
INJECTION, SUSPENSION INTRA-ARTICULAR; INTRAMUSCULAR
Status: DISPENSED
Start: 2022-11-04

## (undated) RX ORDER — TRIAMCINOLONE ACETONIDE 40 MG/ML
INJECTION, SUSPENSION INTRA-ARTICULAR; INTRAMUSCULAR
Status: DISPENSED
Start: 2022-06-20

## (undated) RX ORDER — LIDOCAINE HYDROCHLORIDE 10 MG/ML
INJECTION, SOLUTION EPIDURAL; INFILTRATION; INTRACAUDAL; PERINEURAL
Status: DISPENSED
Start: 2022-11-04

## (undated) RX ORDER — LIDOCAINE HYDROCHLORIDE 5 MG/ML
INJECTION, SOLUTION INFILTRATION; INTRAVENOUS
Status: DISPENSED
Start: 2022-06-22

## (undated) RX ORDER — LIDOCAINE HYDROCHLORIDE 5 MG/ML
INJECTION, SOLUTION INFILTRATION; INTRAVENOUS
Status: DISPENSED
Start: 2022-10-26

## (undated) RX ORDER — TRIAMCINOLONE ACETONIDE 40 MG/ML
INJECTION, SUSPENSION INTRA-ARTICULAR; INTRAMUSCULAR
Status: DISPENSED
Start: 2018-10-03

## (undated) RX ORDER — TRIAMCINOLONE ACETONIDE 40 MG/ML
INJECTION, SUSPENSION INTRA-ARTICULAR; INTRAMUSCULAR
Status: DISPENSED
Start: 2022-06-22